# Patient Record
Sex: FEMALE | Race: BLACK OR AFRICAN AMERICAN | NOT HISPANIC OR LATINO | Employment: UNEMPLOYED | URBAN - METROPOLITAN AREA
[De-identification: names, ages, dates, MRNs, and addresses within clinical notes are randomized per-mention and may not be internally consistent; named-entity substitution may affect disease eponyms.]

---

## 2017-12-18 ENCOUNTER — OFFICE VISIT (OUTPATIENT)
Dept: GASTROENTEROLOGY | Facility: CLINIC | Age: 47
End: 2017-12-18
Payer: MEDICAID

## 2017-12-18 VITALS
DIASTOLIC BLOOD PRESSURE: 84 MMHG | HEIGHT: 61 IN | SYSTOLIC BLOOD PRESSURE: 124 MMHG | WEIGHT: 143 LBS | BODY MASS INDEX: 27 KG/M2

## 2017-12-18 DIAGNOSIS — E01.0 THYROMEGALY: ICD-10-CM

## 2017-12-18 DIAGNOSIS — K62.5 RECTAL BLEEDING: Primary | ICD-10-CM

## 2017-12-18 PROCEDURE — 99204 OFFICE O/P NEW MOD 45 MIN: CPT | Mod: S$PBB,,, | Performed by: INTERNAL MEDICINE

## 2017-12-18 PROCEDURE — 99213 OFFICE O/P EST LOW 20 MIN: CPT | Mod: PBBFAC,PO | Performed by: INTERNAL MEDICINE

## 2017-12-18 PROCEDURE — 99999 PR PBB SHADOW E&M-EST. PATIENT-LVL III: CPT | Mod: PBBFAC,,, | Performed by: INTERNAL MEDICINE

## 2017-12-18 RX ORDER — DICYCLOMINE HYDROCHLORIDE 10 MG/1
10 CAPSULE ORAL 4 TIMES DAILY PRN
Qty: 120 CAPSULE | Refills: 3 | Status: SHIPPED | OUTPATIENT
Start: 2017-12-18 | End: 2018-01-17

## 2017-12-18 NOTE — PROGRESS NOTES
Subjective:       Patient ID: Sonia Hicks is a 47 y.o. female.    Chief Complaint: Rectal Bleeding    Rectal Bleeding   This is a chronic problem. Episode onset: 4 years. The problem occurs intermittently (happens with qo BM). The problem has been unchanged. Associated symptoms include abdominal pain (lower abdomen) and headaches. Pertinent negatives include no arthralgias, chest pain, fever, joint swelling, nausea, neck pain, rash, sore throat, vomiting or weakness. Associated symptoms comments: There is no constipation or diarrhea  . Nothing aggravates the symptoms. She has tried nothing for the symptoms.   Patient had colonoscopy done a year ago. She does not remember what was found or the name of the doctor.  She is currently being treated for H. pylori      Review of Systems   Constitutional: Negative for appetite change and fever.   HENT: Negative for sore throat and trouble swallowing.    Eyes: Negative for photophobia and visual disturbance.   Respiratory: Negative for wheezing.    Cardiovascular: Negative for chest pain and palpitations.   Gastrointestinal: Positive for abdominal pain (lower abdomen) and hematochezia. Negative for diarrhea, nausea and vomiting.        See HPI for details   Musculoskeletal: Negative for arthralgias, joint swelling and neck pain.   Skin: Negative for rash and wound.   Neurological: Positive for headaches. Negative for dizziness, tremors and weakness.   Psychiatric/Behavioral: Negative for behavioral problems and suicidal ideas.       Objective:       Vitals:    12/18/17 1322   BP: 124/84       Physical Exam   Constitutional: She is oriented to person, place, and time. She appears well-nourished.   HENT:   Mouth/Throat: Oropharynx is clear and moist.   Eyes: Pupils are equal, round, and reactive to light.   Neck: Neck supple.   Cardiovascular: Normal heart sounds.    Pulmonary/Chest: Effort normal and breath sounds normal.   Abdominal: Soft. She exhibits no mass. There is no  tenderness. There is no guarding.   Musculoskeletal: Normal range of motion.   Lymphadenopathy:     She has no cervical adenopathy.   Neurological: She is alert and oriented to person, place, and time.   Skin: Skin is warm. No rash noted.   Psychiatric: She has a normal mood and affect.       Past Medical History:   Diagnosis Date    High cholesterol     Hypertension     Ovarian cyst        Past Surgical History:   Procedure Laterality Date    ABDOMINAL SURGERY      oophorectomy         Family History   Problem Relation Age of Onset    Hypertension Mother     Diabetes Mother     Heart disease Mother       of MI    Liver disease Mother      failure    Heart disease Sister     Hypertension Brother     Diabetes Brother     Heart disease Sister        Social History     Social History    Marital status: Single     Spouse name: N/A    Number of children: N/A    Years of education: N/A     Occupational History     [employer]      Social History Main Topics    Smoking status: Current Every Day Smoker     Packs/day: 0.50     Years: 30.00     Types: Cigarettes    Smokeless tobacco: Never Used    Alcohol use Yes      Comment: social ~1/week no more than 4-5 drinks/occasion    Drug use: Yes     Types: Marijuana      Comment: former THC    Sexual activity: Not Asked     Other Topics Concern    None     Social History Narrative    None           Review of patient's allergies indicates:  No Known Allergies    Assessment:       1. Rectal bleeding    2. Thyromegaly        Plan:       Rectal bleeding  -     CBC auto differential; Future; Expected date: 2017    Thyromegaly  -     TSH; Future; Expected date: 2017  -     US Soft Tissue Head Neck Thyroid; Future; Expected date: 2017    Obtain medical record from Dr. Fierro     Further recommendation to follow

## 2018-01-23 ENCOUNTER — TELEPHONE (OUTPATIENT)
Dept: GASTROENTEROLOGY | Facility: CLINIC | Age: 48
End: 2018-01-23

## 2018-01-23 NOTE — TELEPHONE ENCOUNTER
Spoke with pt and she would like to get scheduled for a f/u appt with Dr. Radford. Informed pt that the soonest that Dr. Radford has is 3/12/18. Pt has been offered a sooner appt with Magali on 3/9/18 at 7:40am. Verbal Understanding.

## 2018-03-09 ENCOUNTER — HOSPITAL ENCOUNTER (EMERGENCY)
Facility: HOSPITAL | Age: 48
Discharge: HOME | End: 2018-03-09
Payer: MEDICAID

## 2018-03-09 VITALS
WEIGHT: 138 LBS | BODY MASS INDEX: 26.07 KG/M2 | OXYGEN SATURATION: 97 % | TEMPERATURE: 98 F | SYSTOLIC BLOOD PRESSURE: 134 MMHG | RESPIRATION RATE: 18 BRPM | DIASTOLIC BLOOD PRESSURE: 85 MMHG | HEART RATE: 80 BPM

## 2018-03-09 DIAGNOSIS — S13.4XXA WHIPLASH INJURY TO NECK, INITIAL ENCOUNTER: ICD-10-CM
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
## 2018-03-09 DIAGNOSIS — S39.012A BACK STRAIN, INITIAL ENCOUNTER: ICD-10-CM

## 2018-03-09 PROCEDURE — 25000003 PHARM REV CODE 250: Performed by: NURSE PRACTITIONER

## 2018-03-09 PROCEDURE — 99283 EMERGENCY DEPT VISIT LOW MDM: CPT

## 2018-03-09 RX ORDER — KETOROLAC TROMETHAMINE 10 MG/1
10 TABLET, FILM COATED ORAL
Status: COMPLETED | OUTPATIENT
Start: 2018-03-09 | End: 2018-03-09

## 2018-03-09 RX ORDER — METHOCARBAMOL 500 MG/1
1000 TABLET, FILM COATED ORAL 3 TIMES DAILY PRN
Qty: 15 TABLET | Refills: 0 | Status: SHIPPED | OUTPATIENT
Start: 2018-03-09 | End: 2018-03-14

## 2018-03-09 RX ORDER — METHOCARBAMOL 500 MG/1
500 TABLET, FILM COATED ORAL
Status: COMPLETED | OUTPATIENT
Start: 2018-03-09 | End: 2018-03-09

## 2018-03-09 RX ORDER — NAPROXEN SODIUM 550 MG/1
550 TABLET ORAL 2 TIMES DAILY WITH MEALS
Qty: 20 TABLET | Refills: 0 | Status: ON HOLD | OUTPATIENT
Start: 2018-03-09 | End: 2018-04-19 | Stop reason: CLARIF

## 2018-03-09 RX ORDER — KETOROLAC TROMETHAMINE 10 MG/1
TABLET, FILM COATED ORAL
Status: DISPENSED
Start: 2018-03-09 | End: 2018-03-10

## 2018-03-09 RX ORDER — METHOCARBAMOL 500 MG/1
TABLET, FILM COATED ORAL
Status: DISPENSED
Start: 2018-03-09 | End: 2018-03-10

## 2018-03-09 RX ADMIN — KETOROLAC TROMETHAMINE 10 MG: 10 TABLET, FILM COATED ORAL at 05:03

## 2018-03-09 RX ADMIN — METHOCARBAMOL 500 MG: 500 TABLET ORAL at 05:03
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
## 2018-03-23 ENCOUNTER — OFFICE VISIT (OUTPATIENT)
Dept: GASTROENTEROLOGY | Facility: CLINIC | Age: 48
End: 2018-03-23
Payer: MEDICAID

## 2018-03-23 VITALS
BODY MASS INDEX: 26.06 KG/M2 | WEIGHT: 138 LBS | HEIGHT: 61 IN | DIASTOLIC BLOOD PRESSURE: 94 MMHG | SYSTOLIC BLOOD PRESSURE: 131 MMHG

## 2018-03-23 DIAGNOSIS — R12 HEARTBURN: ICD-10-CM

## 2018-03-23 DIAGNOSIS — Z86.19 HISTORY OF HELICOBACTER PYLORI INFECTION: Primary | ICD-10-CM

## 2018-03-23 DIAGNOSIS — K62.5 ANAL BLEEDING: ICD-10-CM

## 2018-03-23 DIAGNOSIS — K59.00 CONSTIPATION, UNSPECIFIED CONSTIPATION TYPE: ICD-10-CM

## 2018-03-23 DIAGNOSIS — R10.13 ABDOMINAL PAIN, EPIGASTRIC: ICD-10-CM

## 2018-03-23 PROCEDURE — 99212 OFFICE O/P EST SF 10 MIN: CPT | Mod: PBBFAC,PO | Performed by: NURSE PRACTITIONER

## 2018-03-23 PROCEDURE — 99999 PR PBB SHADOW E&M-EST. PATIENT-LVL II: CPT | Mod: PBBFAC,,, | Performed by: NURSE PRACTITIONER

## 2018-03-23 PROCEDURE — 99213 OFFICE O/P EST LOW 20 MIN: CPT | Mod: S$PBB,,, | Performed by: NURSE PRACTITIONER

## 2018-03-23 RX ORDER — HYDROCODONE BITARTRATE AND ACETAMINOPHEN 7.5; 325 MG/1; MG/1
1 TABLET ORAL EVERY 6 HOURS PRN
Status: ON HOLD | COMMUNITY
End: 2018-04-19 | Stop reason: CLARIF

## 2018-03-23 NOTE — TELEPHONE ENCOUNTER
Spoke with pt and was able to get the name of her PCP Dr. Antonietta Bonilla at [facility]. Tried Contacting clinic NO ANSWER. Will continue trying number.

## 2018-03-23 NOTE — TELEPHONE ENCOUNTER
At one point she reports she had a colonoscopy and another time is unsure.   She is unsure if she has had EGD.    Can we please check with her PCP/referring provider and try to get some clarity on whether or not she has had EGD or colonoscopy?

## 2018-03-23 NOTE — PROGRESS NOTES
Subjective:       Patient ID: Sonia Hicks is a 47 y.o. female.    Chief Complaint: Follow-up    HPI  Presents reporting that she is following up.  She was seen by Dr. Radford last year with complaints of rectal bleeding.    Labs and thyroid US ordered.  She reports she had these done in [place] and no findings.  She reports that she continues to have episodic rectal bleeding.  Reports this may occur for several days together one time per month.  She does report constipation that is improved with drinking fruit juice.  She tends to have small volume hard stools.  Denies anal rectal pain.    She reports intermittent epigastric burning, heartburn and nausea.    Dr. Radford mentioned that she'd had colonoscopy last year.  The patient does not recall.  Also mentioned that she was being treated for h pylori and the patient does not know if she has had EGD.  She does not use NSAIDs.    Review of Systems   Constitutional: Negative for activity change, appetite change, fatigue, fever and unexpected weight change.   Respiratory: Negative for shortness of breath.    Cardiovascular: Negative for chest pain.   Gastrointestinal: Positive for abdominal pain, anal bleeding, constipation and nausea. Negative for rectal pain.   Genitourinary: Negative.    Skin: Negative.    Neurological: Negative.    Psychiatric/Behavioral: Negative.        Objective:      Physical Exam   Constitutional: She is oriented to person, place, and time. She appears well-developed and well-nourished. No distress.   HENT:   Head: Normocephalic.   Cardiovascular: Normal rate.    Pulmonary/Chest: Effort normal. No respiratory distress.   Abdominal: Soft. Bowel sounds are normal. She exhibits no distension. There is tenderness (mild discomfort in the epigastrum). There is no guarding.   Neurological: She is alert and oriented to person, place, and time.   Skin: Skin is warm and dry. She is not diaphoretic.   Psychiatric: She has a normal mood and affect. Her  behavior is normal. Judgment and thought content normal.   Vitals reviewed.      Assessment:       1. History of Helicobacter pylori infection    2. Constipation, unspecified constipation type    3. Anal bleeding    4. Heartburn        Plan:     Sonia was seen today for follow-up.    Diagnoses and all orders for this visit:    History of Helicobacter pylori infection  -     H. pylori antigen, stool; Future    Constipation, unspecified constipation type    Anal bleeding    Heartburn    Other orders  -     ranitidine (ZANTAC) 150 MG tablet; Take 1 tablet (150 mg total) by mouth 2 (two) times daily.        -   High fiber diet  -   Fiber supplement  -   [place] fluid intake   -   Exercice  -   Sitz      Stool for h pylori.  Will attempt to request further records as she is unsure if she has had EGD or colonoscopy.    If these have not been completed, will need to schedule.

## 2018-04-03 ENCOUNTER — TELEPHONE (OUTPATIENT)
Dept: GASTROENTEROLOGY | Facility: CLINIC | Age: 48
End: 2018-04-03

## 2018-04-03 NOTE — TELEPHONE ENCOUNTER
Contacted [facility] Medical Records department again. Pt does not have any records for an EGD and Colonoscopy. Pt will need to be scheduled for these procedures.

## 2018-04-03 NOTE — TELEPHONE ENCOUNTER
Spoke with pt and she has agreed to schedule an EGD and Colonoscopy on 4/19/18 with Dr. Padilla. [facility] Prep and information has been explained to patient, and mailed out to home address. Verbal Understanding.

## 2018-04-10 DIAGNOSIS — K62.5 ANAL BLEEDING: ICD-10-CM

## 2018-04-10 DIAGNOSIS — K59.00 CONSTIPATION, UNSPECIFIED CONSTIPATION TYPE: ICD-10-CM

## 2018-04-10 DIAGNOSIS — Z86.19 HISTORY OF HELICOBACTER PYLORI INFECTION: ICD-10-CM

## 2018-04-10 DIAGNOSIS — R12 HEARTBURN: ICD-10-CM

## 2018-04-10 RX ORDER — POLYETHYLENE GLYCOL 3350, SODIUM SULFATE ANHYDROUS, SODIUM BICARBONATE, SODIUM CHLORIDE, POTASSIUM CHLORIDE 236; 22.74; 6.74; 5.86; 2.97 G/4L; G/4L; G/4L; G/4L; G/4L
4 POWDER, FOR SOLUTION ORAL SEE ADMIN INSTRUCTIONS
Qty: 4000 ML | Refills: 0 | Status: SHIPPED | OUTPATIENT
Start: 2018-04-10 | End: 2018-04-10 | Stop reason: SDUPTHER

## 2018-04-10 RX ORDER — POLYETHYLENE GLYCOL 3350, SODIUM SULFATE ANHYDROUS, SODIUM BICARBONATE, SODIUM CHLORIDE, POTASSIUM CHLORIDE 236; 22.74; 6.74; 5.86; 2.97 G/4L; G/4L; G/4L; G/4L; G/4L
4 POWDER, FOR SOLUTION ORAL SEE ADMIN INSTRUCTIONS
Qty: 4000 ML | Refills: 0 | Status: SHIPPED | OUTPATIENT
Start: 2018-04-10 | End: 2018-06-20

## 2018-04-18 ENCOUNTER — LAB VISIT (OUTPATIENT)
Dept: LAB | Facility: HOSPITAL | Age: 48
End: 2018-04-18
Attending: NURSE PRACTITIONER
Payer: MEDICAID

## 2018-04-18 DIAGNOSIS — Z86.19 HISTORY OF HELICOBACTER PYLORI INFECTION: ICD-10-CM

## 2018-04-18 PROCEDURE — 87338 HPYLORI STOOL AG IA: CPT | Mod: PO

## 2018-04-19 ENCOUNTER — HOSPITAL ENCOUNTER (OUTPATIENT)
Facility: HOSPITAL | Age: 48
Discharge: HOME | End: 2018-04-19
Attending: INTERNAL MEDICINE | Admitting: INTERNAL MEDICINE
Payer: MEDICAID

## 2018-04-19 ENCOUNTER — SURGERY (OUTPATIENT)
Age: 48
End: 2018-04-19

## 2018-04-19 ENCOUNTER — ANESTHESIA (OUTPATIENT)
Dept: ENDOSCOPY | Facility: HOSPITAL | Age: 48
End: 2018-04-19
Payer: MEDICAID

## 2018-04-19 ENCOUNTER — ANESTHESIA EVENT (OUTPATIENT)
Dept: ENDOSCOPY | Facility: HOSPITAL | Age: 48
End: 2018-04-19
Payer: MEDICAID

## 2018-04-19 DIAGNOSIS — K92.1 HEMATOCHEZIA: Primary | ICD-10-CM

## 2018-04-19 DIAGNOSIS — Z12.11 SCREENING FOR MALIGNANT NEOPLASM OF COLON: ICD-10-CM

## 2018-04-19 PROCEDURE — 63600175 PHARM REV CODE 636 W HCPCS: Performed by: NURSE ANESTHETIST, CERTIFIED REGISTERED

## 2018-04-19 PROCEDURE — 27201012 HC FORCEPS, HOT/COLD, DISP: Performed by: INTERNAL MEDICINE

## 2018-04-19 PROCEDURE — 25000003 PHARM REV CODE 250: Performed by: INTERNAL MEDICINE

## 2018-04-19 PROCEDURE — 00813 ANES UPR LWR GI NDSC PX: CPT | Performed by: INTERNAL MEDICINE

## 2018-04-19 PROCEDURE — 45380 COLONOSCOPY AND BIOPSY: CPT | Mod: 59 | Performed by: INTERNAL MEDICINE

## 2018-04-19 PROCEDURE — 88305 TISSUE EXAM BY PATHOLOGIST: CPT | Performed by: PATHOLOGY

## 2018-04-19 PROCEDURE — 45385 COLONOSCOPY W/LESION REMOVAL: CPT

## 2018-04-19 PROCEDURE — 37000009 HC ANESTHESIA EA ADD 15 MINS: Performed by: INTERNAL MEDICINE

## 2018-04-19 PROCEDURE — 88305 TISSUE EXAM BY PATHOLOGIST: CPT | Mod: 26,,, | Performed by: PATHOLOGY

## 2018-04-19 PROCEDURE — 45380 COLONOSCOPY AND BIOPSY: CPT | Mod: 59,,, | Performed by: INTERNAL MEDICINE

## 2018-04-19 PROCEDURE — 45385 COLONOSCOPY W/LESION REMOVAL: CPT | Mod: ,,, | Performed by: INTERNAL MEDICINE

## 2018-04-19 PROCEDURE — 37000008 HC ANESTHESIA 1ST 15 MINUTES: Performed by: INTERNAL MEDICINE

## 2018-04-19 PROCEDURE — 43239 EGD BIOPSY SINGLE/MULTIPLE: CPT | Performed by: INTERNAL MEDICINE

## 2018-04-19 PROCEDURE — 43239 EGD BIOPSY SINGLE/MULTIPLE: CPT | Mod: 51,,, | Performed by: INTERNAL MEDICINE

## 2018-04-19 RX ORDER — LIDOCAINE HCL/PF 100 MG/5ML
SYRINGE (ML) INTRAVENOUS
Status: DISCONTINUED | OUTPATIENT
Start: 2018-04-19 | End: 2018-04-19

## 2018-04-19 RX ORDER — PROPOFOL 10 MG/ML
VIAL (ML) INTRAVENOUS CONTINUOUS PRN
Status: DISCONTINUED | OUTPATIENT
Start: 2018-04-19 | End: 2018-04-19

## 2018-04-19 RX ORDER — PROPOFOL 10 MG/ML
VIAL (ML) INTRAVENOUS
Status: DISCONTINUED | OUTPATIENT
Start: 2018-04-19 | End: 2018-04-19

## 2018-04-19 RX ORDER — SODIUM CHLORIDE 9 MG/ML
INJECTION, SOLUTION INTRAVENOUS CONTINUOUS
Status: DISCONTINUED | OUTPATIENT
Start: 2018-04-19 | End: 2018-04-19 | Stop reason: HOSPADM

## 2018-04-19 RX ADMIN — PROPOFOL 50 MG: 10 INJECTION, EMULSION INTRAVENOUS at 10:04

## 2018-04-19 RX ADMIN — PROPOFOL 150 MCG/KG/MIN: 10 INJECTION, EMULSION INTRAVENOUS at 10:04

## 2018-04-19 RX ADMIN — LIDOCAINE HYDROCHLORIDE 80 MG: 20 INJECTION, SOLUTION INTRAVENOUS at 10:04

## 2018-04-19 RX ADMIN — SODIUM CHLORIDE: 0.9 INJECTION, SOLUTION INTRAVENOUS at 09:04

## 2018-04-19 NOTE — PROVATION PATIENT INSTRUCTIONS
Discharge Summary/Instructions after an Endoscopic Procedure  Patient Name: Sonia Hicks  Patient MRN: [id number]  Patient YOB: 1970 Thursday, April 19, 2018  Nina Padilla MD  RESTRICTIONS:  During your procedure today, you received medications for sedation.  These   medications may affect your judgment, balance and coordination.  Therefore,   for 24 hours, you have the following restrictions:   - DO NOT drive a car, operate machinery, make legal/financial decisions,   sign important papers or drink alcohol.    ACTIVITY:  The following day: return to full activity including work, except no heavy   lifting, straining or running for 3 days if polyps were removed.  DIET:  Eat and drink normally unless instructed otherwise.     TREATMENT FOR COMMON SIDE EFFECTS:  - Mild abdominal pain, nausea, belching, bloating or excessive gas:  rest,   eat lightly and use a heating pad.  - Sore Throat: treat with throat lozenges and/or gargle with warm salt   water.  - Because air was used during the procedure, expelling large amounts of air   from your rectum or belching is normal.  - If a bowel prep was taken, you may not have a bowel movement for 1-3 days.    This is normal.  SYMPTOMS TO WATCH FOR AND REPORT TO YOUR PHYSICIAN:  1. Abdominal pain or bloating, other than gas cramps.  2. Chest pain.  3. Back pain.  4. Signs of infection such as: chills or fever occurring within 24 hours   after the procedure.  5. Rectal bleeding, which would show as bright red, maroon, or black stools.   (A tablespoon of blood from the rectum is not serious, especially if   hemorrhoids are present.)  6. Vomiting.  7. Weakness or dizziness.  GO DIRECTLY TO THE NEAREST EMERGENCY ROOM IF YOU HAVE ANY OF THE FOLLOWING:      Difficulty breathing              Chills and/or fever over 101 F   Persistent vomiting and/or vomiting blood   Severe abdominal pain   Severe chest pain   Black, tarry stools   Bleeding- more than one tablespoon   Any  other symptom or condition that you feel may need urgent attention  Your doctor recommends these additional instructions:  If any biopsies were taken, your doctors clinic will contact you in 1 to 2   weeks with any results.  - Discharge patient to home (via wheelchair).   - Patient has a contact number available for emergencies.  The signs and   symptoms of potential delayed complications were discussed with the   patient.  Return to normal activities tomorrow.  Written discharge   instructions were provided to the patient.   - Resume previous diet.   - Continue present medications.   - Await pathology results.   - Repeat colonoscopy date to be determined after pending pathology results   are reviewed for surveillance.  For questions, problems or results please call your physician - Nina Padilla MD at Work:  [phone].  EMERGENCY PHONE NUMBER: [phone],  LAB RESULTS: [phone]  IF A COMPLICATION OR EMERGENCY SITUATION ARISES AND YOU ARE UNABLE TO REACH   YOUR PHYSICIAN - GO DIRECTLY TO THE EMERGENCY ROOM.  Nina Padilla MD  4/19/2018 10:38:47 AM  This report has been verified and signed electronically.

## 2018-04-19 NOTE — H&P (VIEW-ONLY)
Subjective:       Patient ID: Sonia Hicks is a 47 y.o. female.    Chief Complaint: Follow-up    HPI  Presents reporting that she is following up.  She was seen by Dr. Radford last year with complaints of rectal bleeding.    Labs and thyroid US ordered.  She reports she had these done in [place] and no findings.  She reports that she continues to have episodic rectal bleeding.  Reports this may occur for several days together one time per month.  She does report constipation that is improved with drinking fruit juice.  She tends to have small volume hard stools.  Denies anal rectal pain.    She reports intermittent epigastric burning, heartburn and nausea.    Dr. Radford mentioned that she'd had colonoscopy last year.  The patient does not recall.  Also mentioned that she was being treated for h pylori and the patient does not know if she has had EGD.  She does not use NSAIDs.    Review of Systems   Constitutional: Negative for activity change, appetite change, fatigue, fever and unexpected weight change.   Respiratory: Negative for shortness of breath.    Cardiovascular: Negative for chest pain.   Gastrointestinal: Positive for abdominal pain, anal bleeding, constipation and nausea. Negative for rectal pain.   Genitourinary: Negative.    Skin: Negative.    Neurological: Negative.    Psychiatric/Behavioral: Negative.        Objective:      Physical Exam   Constitutional: She is oriented to person, place, and time. She appears well-developed and well-nourished. No distress.   HENT:   Head: Normocephalic.   Cardiovascular: Normal rate.    Pulmonary/Chest: Effort normal. No respiratory distress.   Abdominal: Soft. Bowel sounds are normal. She exhibits no distension. There is tenderness (mild discomfort in the epigastrum). There is no guarding.   Neurological: She is alert and oriented to person, place, and time.   Skin: Skin is warm and dry. She is not diaphoretic.   Psychiatric: She has a normal mood and affect. Her  behavior is normal. Judgment and thought content normal.   Vitals reviewed.      Assessment:       1. History of Helicobacter pylori infection    2. Constipation, unspecified constipation type    3. Anal bleeding    4. Heartburn        Plan:     Sonia was seen today for follow-up.    Diagnoses and all orders for this visit:    History of Helicobacter pylori infection  -     H. pylori antigen, stool; Future    Constipation, unspecified constipation type    Anal bleeding    Heartburn    Other orders  -     ranitidine (ZANTAC) 150 MG tablet; Take 1 tablet (150 mg total) by mouth 2 (two) times daily.        -   High fiber diet  -   Fiber supplement  -   [place] fluid intake   -   Exercice  -   Sitz      Stool for h pylori.  Will attempt to request further records as she is unsure if she has had EGD or colonoscopy.    If these have not been completed, will need to schedule.

## 2018-04-19 NOTE — PROVATION PATIENT INSTRUCTIONS
Discharge Summary/Instructions after an Endoscopic Procedure  Patient Name: Sonia Hicks  Patient MRN: [id number]  Patient YOB: 1970 Thursday, April 19, 2018  Nina Padilla MD  RESTRICTIONS:  During your procedure today, you received medications for sedation.  These   medications may affect your judgment, balance and coordination.  Therefore,   for 24 hours, you have the following restrictions:   - DO NOT drive a car, operate machinery, make legal/financial decisions,   sign important papers or drink alcohol.    ACTIVITY:  The following day: return to full activity including work, except no heavy   lifting, straining or running for 3 days if polyps were removed.  DIET:  Eat and drink normally unless instructed otherwise.     TREATMENT FOR COMMON SIDE EFFECTS:  - Mild abdominal pain, nausea, belching, bloating or excessive gas:  rest,   eat lightly and use a heating pad.  - Sore Throat: treat with throat lozenges and/or gargle with warm salt   water.  - Because air was used during the procedure, expelling large amounts of air   from your rectum or belching is normal.  - If a bowel prep was taken, you may not have a bowel movement for 1-3 days.    This is normal.  SYMPTOMS TO WATCH FOR AND REPORT TO YOUR PHYSICIAN:  1. Abdominal pain or bloating, other than gas cramps.  2. Chest pain.  3. Back pain.  4. Signs of infection such as: chills or fever occurring within 24 hours   after the procedure.  5. Rectal bleeding, which would show as bright red, maroon, or black stools.   (A tablespoon of blood from the rectum is not serious, especially if   hemorrhoids are present.)  6. Vomiting.  7. Weakness or dizziness.  GO DIRECTLY TO THE NEAREST EMERGENCY ROOM IF YOU HAVE ANY OF THE FOLLOWING:      Difficulty breathing              Chills and/or fever over 101 F   Persistent vomiting and/or vomiting blood   Severe abdominal pain   Severe chest pain   Black, tarry stools   Bleeding- more than one tablespoon   Any  other symptom or condition that you feel may need urgent attention  Your doctor recommends these additional instructions:  If any biopsies were taken, your doctors clinic will contact you in 1 to 2   weeks with any results.  - Discharge patient to home (via wheelchair).   - Patient has a contact number available for emergencies.  The signs and   symptoms of potential delayed complications were discussed with the   patient.  Return to normal activities tomorrow.  Written discharge   instructions were provided to the patient.   - Resume previous diet.   - Continue present medications.   - Await pathology results.  For questions, problems or results please call your physician - Nina Padilla MD at Work:  [phone].  EMERGENCY PHONE NUMBER: [phone],  LAB RESULTS: [phone]  IF A COMPLICATION OR EMERGENCY SITUATION ARISES AND YOU ARE UNABLE TO REACH   YOUR PHYSICIAN - GO DIRECTLY TO THE EMERGENCY ROOM.  Nina Padilla MD  4/19/2018 10:11:23 AM  This report has been verified and signed electronically.

## 2018-04-19 NOTE — ANESTHESIA POSTPROCEDURE EVALUATION
"Anesthesia Post Evaluation    Patient: Sonia Hicks    Procedure(s) Performed: Procedure(s) (LRB):  ESOPHAGOGASTRODUODENOSCOPY (EGD) (N/A)  COLONOSCOPY Golytely (N/A)    Final Anesthesia Type: MAC  Patient location during evaluation: GI PACU  Patient participation: Yes- Able to Participate  Level of consciousness: awake and alert and oriented  Post-procedure vital signs: reviewed and stable  Pain management: adequate  Airway patency: patent  PONV status at discharge: No PONV  Anesthetic complications: no      Cardiovascular status: blood pressure returned to baseline, hemodynamically stable and stable  Respiratory status: room air, unassisted and spontaneous ventilation  Hydration status: euvolemic  Follow-up not needed.        Visit Vitals  /75   Pulse 79   Temp 37.1 °C (98.7 °F) (Oral)   Resp 18   Ht 5' 1" (1.549 m)   Wt 60.8 kg (134 lb)   SpO2 98%   Breastfeeding? No   BMI 25.32 kg/m²       Pain/Hailey Score: Presence of Pain: denies (4/19/2018  9:25 AM)      "

## 2018-04-19 NOTE — TRANSFER OF CARE
"Anesthesia Transfer of Care Note    Patient: Sonia Hicks    Procedure(s) Performed: Procedure(s) (LRB):  ESOPHAGOGASTRODUODENOSCOPY (EGD) (N/A)  COLONOSCOPY Golytely (N/A)    Patient location: GI    Anesthesia Type: MAC    Transport from OR: Transported from OR on room air with adequate spontaneous ventilation    Post pain: adequate analgesia    Post assessment: no apparent anesthetic complications    Post vital signs: stable    Level of consciousness: awake, alert and oriented    Nausea/Vomiting: no nausea/vomiting    Complications: none    Transfer of care protocol was followed      Last vitals:   Visit Vitals  /75   Pulse 79   Temp 37.1 °C (98.7 °F) (Oral)   Resp 18   Ht 5' 1" (1.549 m)   Wt 60.8 kg (134 lb)   SpO2 98%   Breastfeeding? No   BMI 25.32 kg/m²     "

## 2018-04-19 NOTE — ANESTHESIA PREPROCEDURE EVALUATION
04/19/2018  Sonia Hicks is a 47 y.o., female having an upper/lower endo.   Hx HTN, ?dysphagia, rectal; bleeding.       Anesthesia Evaluation    I have reviewed the Patient Summary Reports.    I have reviewed the Nursing Notes.   I have reviewed the Medications.     Review of Systems  Anesthesia Hx:  No problems with previous Anesthesia   Denies Personal Hx of Anesthesia complications.   Social:  Smoker    Cardiovascular:   Hypertension        Physical Exam  General:  Well nourished    Airway/Jaw/Neck:  Airway Findings: Mouth Opening: Normal Tongue: Normal  General Airway Assessment: Adult  Mallampati: III  Improves to II with phonation.  TM Distance: Normal, at least 6 cm  Jaw/Neck Findings:  Neck ROM: Normal ROM       Chest/Lungs:  Chest/Lungs Findings: Clear to auscultation, Normal Respiratory Rate     Heart/Vascular:  Heart Findings: Rate: Normal  Rhythm: Regular Rhythm  Sounds: Normal        Mental Status:  Mental Status Findings:  Alert and Oriented         Anesthesia Plan  Type of Anesthesia, risks & benefits discussed:  Anesthesia Type:  MAC  Patient's Preference:   Intra-op Monitoring Plan:   Intra-op Monitoring Plan Comments:   Post Op Pain Control Plan:   Post Op Pain Control Plan Comments:   Induction:   IV  Beta Blocker:  Patient is not currently on a Beta-Blocker (No further documentation required).       Informed Consent: Patient understands risks and agrees with Anesthesia plan.  Questions answered. Anesthesia consent signed with patient.  ASA Score: 2     Day of Surgery Review of History & Physical: I have interviewed and examined the patient. I have reviewed the patient's H&P dated:            Ready For Surgery From Anesthesia Perspective.

## 2018-04-20 VITALS
HEART RATE: 90 BPM | HEIGHT: 61 IN | SYSTOLIC BLOOD PRESSURE: 126 MMHG | BODY MASS INDEX: 25.3 KG/M2 | TEMPERATURE: 98 F | WEIGHT: 134 LBS | RESPIRATION RATE: 17 BRPM | DIASTOLIC BLOOD PRESSURE: 93 MMHG | OXYGEN SATURATION: 100 %

## 2018-04-22 LAB — H PYLORI AG STL QL: NOT DETECTED

## 2018-04-26 ENCOUNTER — TELEPHONE (OUTPATIENT)
Dept: GASTROENTEROLOGY | Facility: CLINIC | Age: 48
End: 2018-04-26

## 2018-04-26 NOTE — TELEPHONE ENCOUNTER
Results given to patient. Patient verbalized understanding. Symptoms persists.  Post procedure appt scheduled.

## 2018-04-26 NOTE — TELEPHONE ENCOUNTER
----- Message from Nina Padilla MD sent at 4/25/2018  7:54 AM CDT -----  Moderate stomach inflammation, hpylori negative. Colon polyps benign, 10 year recall colonoscopy; can f/u in clinic if persistent symptoms

## 2018-04-26 NOTE — TELEPHONE ENCOUNTER
----- Message from ABE Mirza sent at 4/24/2018 11:19 AM CDT -----  Let her know that stool specimen is negative for h pylori

## 2018-05-01 ENCOUNTER — TELEPHONE (OUTPATIENT)
Dept: GASTROENTEROLOGY | Facility: CLINIC | Age: 48
End: 2018-05-01

## 2018-05-01 NOTE — TELEPHONE ENCOUNTER
Left a message for patient to call the office back in regards to appointment that is scheduled for tomorrow.

## 2018-05-01 NOTE — TELEPHONE ENCOUNTER
----- Message from Trang Muhammad sent at 5/1/2018 12:36 PM CDT -----  Contact: Self/ [phone]  Patient would like to reschedule her appointment for next week. She has Medicaid insurance.    Please call and advise.

## 2018-06-20 ENCOUNTER — HOSPITAL ENCOUNTER (EMERGENCY)
Facility: HOSPITAL | Age: 48
Discharge: HOME | End: 2018-06-20
Attending: EMERGENCY MEDICINE
Payer: MEDICAID

## 2018-06-20 VITALS
DIASTOLIC BLOOD PRESSURE: 95 MMHG | HEIGHT: 61 IN | HEART RATE: 70 BPM | SYSTOLIC BLOOD PRESSURE: 134 MMHG | RESPIRATION RATE: 20 BRPM | WEIGHT: 130 LBS | TEMPERATURE: 98 F | BODY MASS INDEX: 24.55 KG/M2 | OXYGEN SATURATION: 96 %

## 2018-06-20 DIAGNOSIS — S29.012A STRAIN OF THORACIC PARASPINAL MUSCLES EXCLUDING T1 AND T2 LEVELS, INITIAL ENCOUNTER: Primary | ICD-10-CM

## 2018-06-20 DIAGNOSIS — S39.012A STRAIN OF LUMBAR PARASPINAL MUSCLE, INITIAL ENCOUNTER: ICD-10-CM
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
## 2018-06-20 LAB — B-HCG UR QL: NEGATIVE

## 2018-06-20 PROCEDURE — 99284 EMERGENCY DEPT VISIT MOD MDM: CPT | Mod: 25

## 2018-06-20 PROCEDURE — 81025 URINE PREGNANCY TEST: CPT

## 2018-06-20 RX ORDER — CYCLOBENZAPRINE HCL 5 MG
5 TABLET ORAL 3 TIMES DAILY PRN
Qty: 15 TABLET | Refills: 0 | Status: SHIPPED | OUTPATIENT
Start: 2018-06-20 | End: 2018-06-25

## 2018-06-20 NOTE — ED NOTES
"Pt called to triage, adult female in waiting area reports "she will be there in a moment, she let someone else go ahead of her that had an emergency." Reports pt has to check in other family members.   "
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
## 2018-10-08 ENCOUNTER — LAB VISIT (OUTPATIENT)
Dept: LAB | Facility: HOSPITAL | Age: 48
End: 2018-10-08
Attending: NURSE PRACTITIONER
Payer: MEDICAID

## 2018-10-08 DIAGNOSIS — Z79.899 OTHER LONG TERM (CURRENT) DRUG THERAPY: Primary | ICD-10-CM

## 2018-10-08 LAB
BASOPHILS # BLD AUTO: 0.01 K/UL
BASOPHILS NFR BLD: 0.1 %
CHOLEST SERPL-MCNC: 272 MG/DL
CHOLEST/HDLC SERPL: 5 {RATIO}
DIFFERENTIAL METHOD: ABNORMAL
EOSINOPHIL # BLD AUTO: 0 K/UL
EOSINOPHIL NFR BLD: 0.6 %
ERYTHROCYTE [DISTWIDTH] IN BLOOD BY AUTOMATED COUNT: 14.7 %
HCT VFR BLD AUTO: 39.9 %
HDLC SERPL-MCNC: 54 MG/DL
HDLC SERPL: 19.9 %
HGB BLD-MCNC: 12.7 G/DL
LDLC SERPL CALC-MCNC: 190.6 MG/DL
LYMPHOCYTES # BLD AUTO: 3.1 K/UL
LYMPHOCYTES NFR BLD: 45.2 %
MCH RBC QN AUTO: 26 PG
MCHC RBC AUTO-ENTMCNC: 31.8 G/DL
MCV RBC AUTO: 82 FL
MONOCYTES # BLD AUTO: 0.6 K/UL
MONOCYTES NFR BLD: 8 %
NEUTROPHILS # BLD AUTO: 3.2 K/UL
NEUTROPHILS NFR BLD: 45.8 %
NONHDLC SERPL-MCNC: 218 MG/DL
PLATELET # BLD AUTO: 233 K/UL
PMV BLD AUTO: 9.9 FL
RBC # BLD AUTO: 4.89 M/UL
TRIGL SERPL-MCNC: 137 MG/DL
WBC # BLD AUTO: 6.9 K/UL

## 2018-10-08 PROCEDURE — 85025 COMPLETE CBC W/AUTO DIFF WBC: CPT | Mod: PO

## 2018-10-08 PROCEDURE — 36415 COLL VENOUS BLD VENIPUNCTURE: CPT | Mod: PO

## 2018-10-08 PROCEDURE — 80061 LIPID PANEL: CPT

## 2018-10-10 ENCOUNTER — LAB VISIT (OUTPATIENT)
Dept: LAB | Facility: HOSPITAL | Age: 48
End: 2018-10-10
Attending: NURSE PRACTITIONER
Payer: MEDICAID

## 2018-10-10 DIAGNOSIS — Z79.899 OTHER LONG TERM (CURRENT) DRUG THERAPY: Primary | ICD-10-CM

## 2018-10-10 LAB
ALBUMIN SERPL BCP-MCNC: 4.6 G/DL
ALP SERPL-CCNC: 97 U/L
ALT SERPL W/O P-5'-P-CCNC: 15 U/L
ANION GAP SERPL CALC-SCNC: 8 MMOL/L
AST SERPL-CCNC: 23 U/L
BILIRUB SERPL-MCNC: 0.3 MG/DL
BUN SERPL-MCNC: 13 MG/DL
CALCIUM SERPL-MCNC: 10.2 MG/DL
CHLORIDE SERPL-SCNC: 104 MMOL/L
CHOLEST SERPL-MCNC: 281 MG/DL
CHOLEST/HDLC SERPL: 6.1 {RATIO}
CO2 SERPL-SCNC: 27 MMOL/L
CREAT SERPL-MCNC: 0.66 MG/DL
EST. GFR  (AFRICAN AMERICAN): >60 ML/MIN/1.73 M^2
EST. GFR  (NON AFRICAN AMERICAN): >60 ML/MIN/1.73 M^2
GLUCOSE SERPL-MCNC: 106 MG/DL
HDLC SERPL-MCNC: 46 MG/DL
HDLC SERPL: 16.4 %
LDLC SERPL CALC-MCNC: 200.4 MG/DL
NONHDLC SERPL-MCNC: 235 MG/DL
POTASSIUM SERPL-SCNC: 4.4 MMOL/L
PROT SERPL-MCNC: 8.3 G/DL
SODIUM SERPL-SCNC: 139 MMOL/L
TRIGL SERPL-MCNC: 173 MG/DL
TSH SERPL DL<=0.005 MIU/L-ACNC: 1.23 UIU/ML

## 2018-10-10 PROCEDURE — 36415 COLL VENOUS BLD VENIPUNCTURE: CPT | Mod: PO

## 2018-10-10 PROCEDURE — 84443 ASSAY THYROID STIM HORMONE: CPT | Mod: PO

## 2018-10-10 PROCEDURE — 80053 COMPREHEN METABOLIC PANEL: CPT | Mod: PO

## 2018-10-10 PROCEDURE — 80061 LIPID PANEL: CPT

## 2019-04-16 ENCOUNTER — HOSPITAL ENCOUNTER (EMERGENCY)
Facility: HOSPITAL | Age: 49
Discharge: HOME | End: 2019-04-16
Attending: FAMILY MEDICINE
Payer: MEDICAID

## 2019-04-16 VITALS
SYSTOLIC BLOOD PRESSURE: 135 MMHG | DIASTOLIC BLOOD PRESSURE: 87 MMHG | RESPIRATION RATE: 15 BRPM | HEIGHT: 61 IN | TEMPERATURE: 97 F | BODY MASS INDEX: 24.55 KG/M2 | OXYGEN SATURATION: 97 % | WEIGHT: 130 LBS | HEART RATE: 75 BPM

## 2019-04-16 DIAGNOSIS — M54.50 CHRONIC BILATERAL LOW BACK PAIN WITHOUT SCIATICA: ICD-10-CM

## 2019-04-16 DIAGNOSIS — R42 DIZZINESS: ICD-10-CM

## 2019-04-16 DIAGNOSIS — K64.9 HEMORRHOIDS, UNSPECIFIED HEMORRHOID TYPE: ICD-10-CM

## 2019-04-16 DIAGNOSIS — R51.9 FRONTAL HEADACHE: Primary | ICD-10-CM

## 2019-04-16 DIAGNOSIS — G89.29 CHRONIC BILATERAL LOW BACK PAIN WITHOUT SCIATICA: ICD-10-CM

## 2019-04-16 DIAGNOSIS — R07.9 CHEST PAIN: ICD-10-CM

## 2019-04-16 LAB
ALBUMIN SERPL BCP-MCNC: 4.4 G/DL (ref 3.5–5.2)
ALP SERPL-CCNC: 95 U/L (ref 38–126)
ALT SERPL W/O P-5'-P-CCNC: 14 U/L (ref 10–44)
ANION GAP SERPL CALC-SCNC: 5 MMOL/L (ref 8–16)
AST SERPL-CCNC: 22 U/L (ref 15–46)
B-HCG UR QL: NEGATIVE
BASOPHILS # BLD AUTO: 0.02 K/UL (ref 0–0.2)
BASOPHILS NFR BLD: 0.2 % (ref 0–1.9)
BILIRUB SERPL-MCNC: 0.3 MG/DL (ref 0.1–1)
BILIRUB UR QL STRIP: NEGATIVE
BUN SERPL-MCNC: 14 MG/DL (ref 7–17)
CALCIUM SERPL-MCNC: 10.1 MG/DL (ref 8.7–10.5)
CHLORIDE SERPL-SCNC: 106 MMOL/L (ref 95–110)
CLARITY UR REFRACT.AUTO: CLEAR
CO2 SERPL-SCNC: 30 MMOL/L (ref 23–29)
COLOR UR AUTO: YELLOW
CREAT SERPL-MCNC: 0.83 MG/DL (ref 0.5–1.4)
DIFFERENTIAL METHOD: ABNORMAL
EOSINOPHIL # BLD AUTO: 0.1 K/UL (ref 0–0.5)
EOSINOPHIL NFR BLD: 0.8 % (ref 0–8)
ERYTHROCYTE [DISTWIDTH] IN BLOOD BY AUTOMATED COUNT: 14 % (ref 11.5–14.5)
EST. GFR  (AFRICAN AMERICAN): >60 ML/MIN/1.73 M^2
EST. GFR  (NON AFRICAN AMERICAN): >60 ML/MIN/1.73 M^2
GLUCOSE SERPL-MCNC: 81 MG/DL (ref 70–110)
GLUCOSE UR QL STRIP: NEGATIVE
HCT VFR BLD AUTO: 37 % (ref 37–48.5)
HGB BLD-MCNC: 11.8 G/DL (ref 12–16)
HGB UR QL STRIP: ABNORMAL
KETONES UR QL STRIP: NEGATIVE
LEUKOCYTE ESTERASE UR QL STRIP: NEGATIVE
LYMPHOCYTES # BLD AUTO: 3.3 K/UL (ref 1–4.8)
LYMPHOCYTES NFR BLD: 37.5 % (ref 18–48)
MCH RBC QN AUTO: 26.3 PG (ref 27–31)
MCHC RBC AUTO-ENTMCNC: 31.9 G/DL (ref 32–36)
MCV RBC AUTO: 83 FL (ref 82–98)
MICROSCOPIC COMMENT: NORMAL
MONOCYTES # BLD AUTO: 0.5 K/UL (ref 0.3–1)
MONOCYTES NFR BLD: 5.9 % (ref 4–15)
NEUTROPHILS # BLD AUTO: 4.9 K/UL (ref 1.8–7.7)
NEUTROPHILS NFR BLD: 55.4 % (ref 38–73)
NITRITE UR QL STRIP: NEGATIVE
NT-PROBNP: 96 PG/ML (ref 5–450)
OB PNL STL: POSITIVE
PH UR STRIP: 6 [PH] (ref 5–8)
PLATELET # BLD AUTO: 212 K/UL (ref 150–350)
PMV BLD AUTO: 8.9 FL (ref 9.2–12.9)
POTASSIUM SERPL-SCNC: 3.9 MMOL/L (ref 3.5–5.1)
PROT SERPL-MCNC: 8 G/DL (ref 6–8.4)
PROT UR QL STRIP: NEGATIVE
RBC # BLD AUTO: 4.48 M/UL (ref 4–5.4)
RBC #/AREA URNS AUTO: 1 /HPF (ref 0–4)
SODIUM SERPL-SCNC: 141 MMOL/L (ref 136–145)
SP GR UR STRIP: 1.02 (ref 1–1.03)
TROPONIN I SERPL DL<=0.01 NG/ML-MCNC: <0.012 NG/ML (ref 0.01–0.03)
URN SPEC COLLECT METH UR: ABNORMAL
UROBILINOGEN UR STRIP-ACNC: NEGATIVE EU/DL
WBC # BLD AUTO: 8.82 K/UL (ref 3.9–12.7)

## 2019-04-16 PROCEDURE — 99285 EMERGENCY DEPT VISIT HI MDM: CPT | Mod: 25,ER

## 2019-04-16 PROCEDURE — 63600175 PHARM REV CODE 636 W HCPCS: Mod: ER | Performed by: PHYSICIAN ASSISTANT

## 2019-04-16 PROCEDURE — 84484 ASSAY OF TROPONIN QUANT: CPT | Mod: ER

## 2019-04-16 PROCEDURE — 96374 THER/PROPH/DIAG INJ IV PUSH: CPT | Mod: ER

## 2019-04-16 PROCEDURE — 81025 URINE PREGNANCY TEST: CPT | Mod: ER

## 2019-04-16 PROCEDURE — 85025 COMPLETE CBC W/AUTO DIFF WBC: CPT | Mod: ER

## 2019-04-16 PROCEDURE — 83880 ASSAY OF NATRIURETIC PEPTIDE: CPT | Mod: ER

## 2019-04-16 PROCEDURE — 82272 OCCULT BLD FECES 1-3 TESTS: CPT | Mod: ER

## 2019-04-16 PROCEDURE — 93010 ELECTROCARDIOGRAM REPORT: CPT | Mod: ,,, | Performed by: INTERNAL MEDICINE

## 2019-04-16 PROCEDURE — 81000 URINALYSIS NONAUTO W/SCOPE: CPT | Mod: ER

## 2019-04-16 PROCEDURE — 96361 HYDRATE IV INFUSION ADD-ON: CPT | Mod: ER

## 2019-04-16 PROCEDURE — 93010 EKG 12-LEAD: ICD-10-PCS | Mod: ,,, | Performed by: INTERNAL MEDICINE

## 2019-04-16 PROCEDURE — 25000003 PHARM REV CODE 250: Mod: ER | Performed by: PHYSICIAN ASSISTANT

## 2019-04-16 PROCEDURE — 93005 ELECTROCARDIOGRAM TRACING: CPT | Mod: ER

## 2019-04-16 PROCEDURE — 80053 COMPREHEN METABOLIC PANEL: CPT | Mod: ER

## 2019-04-16 RX ORDER — ESCITALOPRAM OXALATE 10 MG/1
10 TABLET ORAL DAILY
COMMUNITY

## 2019-04-16 RX ORDER — CHLORZOXAZONE 500 MG/1
500 TABLET ORAL 4 TIMES DAILY PRN
COMMUNITY

## 2019-04-16 RX ORDER — KETOROLAC TROMETHAMINE 30 MG/ML
15 INJECTION, SOLUTION INTRAMUSCULAR; INTRAVENOUS
Status: COMPLETED | OUTPATIENT
Start: 2019-04-16 | End: 2019-04-16

## 2019-04-16 RX ORDER — ARIPIPRAZOLE 5 MG/1
5 TABLET ORAL DAILY
COMMUNITY

## 2019-04-16 RX ORDER — HYDROCORTISONE 25 MG/G
CREAM TOPICAL 2 TIMES DAILY
Qty: 3.5 G | Refills: 0 | Status: SHIPPED | OUTPATIENT
Start: 2019-04-16

## 2019-04-16 RX ORDER — HYDROCODONE BITARTRATE AND ACETAMINOPHEN 7.5; 325 MG/1; MG/1
1 TABLET ORAL EVERY 6 HOURS PRN
COMMUNITY

## 2019-04-16 RX ORDER — CYPROHEPTADINE HYDROCHLORIDE 4 MG/1
4 TABLET ORAL 3 TIMES DAILY PRN
COMMUNITY

## 2019-04-16 RX ORDER — DICLOFENAC SODIUM 10 MG/G
2 GEL TOPICAL 4 TIMES DAILY
Qty: 100 G | Refills: 0 | Status: SHIPPED | OUTPATIENT
Start: 2019-04-16

## 2019-04-16 RX ORDER — DOCUSATE SODIUM 100 MG/1
100 CAPSULE, LIQUID FILLED ORAL 2 TIMES DAILY PRN
Qty: 60 CAPSULE | Refills: 0 | Status: SHIPPED | OUTPATIENT
Start: 2019-04-16

## 2019-04-16 RX ADMIN — KETOROLAC TROMETHAMINE 15 MG: 30 INJECTION, SOLUTION INTRAMUSCULAR at 08:04

## 2019-04-16 RX ADMIN — SODIUM CHLORIDE 1000 ML: 0.9 INJECTION, SOLUTION INTRAVENOUS at 08:04

## 2019-04-17 NOTE — DISCHARGE INSTRUCTIONS
You are advised to follow up with your primary care provider for re-evaluation within 3 days.  You are instructed to return to the emergency department immediately for any new or worsening symptoms.

## 2019-04-17 NOTE — ED PROVIDER NOTES
Encounter Date: 4/16/2019       History     Chief Complaint   Patient presents with    Migraine     patient c/o intermittent frontal migraine x 1 week with dizziness    Spasms     c/o lower back muscle spasms since yesterday. No recent injury. Reports chronic back pain.     48-year-old female presents to the emergency department for evaluation of 1 week history of frontal headache, dizziness, low back pain, muscle spasms, and intermittent right-sided chest pain.  She reports that the symptoms began gradually 1 week ago and have been intermittent since onset.  She reports that the chest pain is a mild, achy pain in the right side of her chest that last for several minutes at a time prior to resolving.  She denies any shortness of breath, cough, palpitations, nausea, vomiting or sweating when the chest pain is present.  She reports a constant, achy, pain in her low back with intermittent spasms.  She states that this is a chronic issue she has been dealing with for years.  She denies any pain to her neck, upper back or lower extremities. She denies any numbness, tingling, weakness or swelling to the lower extremities.  Denies any bowel or bladder incontinence.  She denies any known trauma. She states that she has chronic pain in her low back.  She states that she has an intermittent frontal headache which is causing her mild dizziness over the last week.  Denies any vision changes, fever, ear pain, tinnitus, neck pain or vomiting. She reports that she also has chronic blood in her stool.  She states that this has been going on for several years and her doctors are well aware of this finding.  They keep telling her that it is not concerning.  She reports only small amounts of bright red blood on the toilet paper or on the stool when she has a bowel movement.  She reports that she has been dealing with chronic constipation for several years.  States that her doctors told her to increase her fiber but that has not been  helping.  Reports her last bowel movement was yesterday.  She has attempted treatment at home with Tylenol with no relief of symptoms.        Review of patient's allergies indicates:  No Known Allergies  Past Medical History:   Diagnosis Date    High cholesterol     Hypertension     Ovarian cyst      Past Surgical History:   Procedure Laterality Date    ABDOMINAL SURGERY      COLONOSCOPY Golytely N/A 2018    [facility]    ESOPHAGOGASTRODUODENOSCOPY (EGD) N/A 2018    Performed by [facility] at [facility] END[facility]    oophorectomy       Family History   Problem Relation Age of Onset    Hypertension Mother     Diabetes Mother     Heart disease Mother          of MI    Liver disease Mother         failure    Heart disease Sister     Hypertension Brother     Diabetes Brother     Heart disease Sister      Social History     Tobacco Use    Smoking status: Current Every Day Smoker     Packs/day: 0.50     Years: 30.00     Pack years: 15.00     Types: Cigarettes    Smokeless tobacco: Never Used   Substance Use Topics    Alcohol use: Yes     Comment: social ~1/week no more than 4-5 drinks/occasion    Drug use: Yes     Types: Marijuana     Comment: former THC     Review of Systems   Constitutional: Negative for activity change, appetite change and fever.   HENT: Negative for congestion, ear discharge, nosebleeds, postnasal drip, rhinorrhea, sinus pressure, sore throat, trouble swallowing and voice change.    Eyes: Negative for photophobia and visual disturbance.   Respiratory: Negative for cough, chest tightness, shortness of breath and wheezing.    Cardiovascular: Positive for chest pain. Negative for palpitations and leg swelling.   Gastrointestinal: Positive for blood in stool and constipation. Negative for abdominal pain, diarrhea, nausea, rectal pain and vomiting.   Genitourinary: Negative for decreased urine volume, dysuria, flank pain, hematuria and pelvic  pain.   Musculoskeletal: Positive for back pain. Negative for joint swelling, neck pain and neck stiffness.   Skin: Negative for rash.   Neurological: Positive for dizziness and headaches. Negative for syncope, weakness, light-headedness and numbness.       Physical Exam     Initial Vitals [04/16/19 1833]   BP Pulse Resp Temp SpO2   (!) 138/92 98 18 97 °F (36.1 °C) 97 %      MAP       --         Physical Exam    Nursing note and vitals reviewed.  Constitutional: She appears well-developed and well-nourished. She is not diaphoretic. No distress.   HENT:   Head: Normocephalic and atraumatic.   Right Ear: External ear normal.   Left Ear: External ear normal.   Nose: Nose normal.   Mouth/Throat: Oropharynx is clear and moist.   Eyes: Conjunctivae and EOM are normal. Pupils are equal, round, and reactive to light.   Neck: Normal range of motion. Neck supple.   Cardiovascular: Normal rate, regular rhythm and normal heart sounds. Exam reveals no gallop and no friction rub.    No murmur heard.  Pulmonary/Chest: Breath sounds normal. No respiratory distress. She has no wheezes. She has no rhonchi. She has no rales. She exhibits no tenderness.   Abdominal: Soft. Bowel sounds are normal. She exhibits no distension. There is no tenderness. There is no rebound.   Genitourinary:   Genitourinary Comments: Small external hemorrhoid noted. No active bleeding noted.  Hemorrhoid is not thrombosed.   Musculoskeletal:        Right hip: She exhibits normal range of motion, normal strength and no tenderness.        Left hip: She exhibits normal range of motion, normal strength and no tenderness.        Cervical back: She exhibits normal range of motion, no tenderness and no bony tenderness.        Thoracic back: She exhibits normal range of motion, no tenderness and no bony tenderness.        Lumbar back: She exhibits tenderness. She exhibits normal range of motion, no bony tenderness, no swelling and no edema.   Lymphadenopathy:     She  has no cervical adenopathy.   Neurological: She is alert and oriented to person, place, and time. No cranial nerve deficit.   Skin: Skin is warm and dry.   Psychiatric: She has a normal mood and affect.         ED Course   Procedures  Labs Reviewed   CBC W/ AUTO DIFFERENTIAL - Abnormal; Notable for the following components:       Result Value    Hemoglobin 11.8 (*)     MCH 26.3 (*)     MCHC 31.9 (*)     MPV 8.9 (*)     All other components within normal limits   COMPREHENSIVE METABOLIC PANEL - Abnormal; Notable for the following components:    CO2 30 (*)     Anion Gap 5 (*)     All other components within normal limits   PREGNANCY TEST, URINE RAPID   TROPONIN I   URINALYSIS, REFLEX TO URINE CULTURE   NT-PRO NATRIURETIC PEPTIDE   OCCULT BLOOD X 1, STOOL     EKG Readings: (Independently Interpreted)   Initial Reading: No STEMI. Rhythm: Normal Sinus Rhythm. Heart Rate: 95.       Imaging Results    None          Medical Decision Making:   Initial Assessment:   48-year-old female presents for evaluation of frontal headache, dizziness, chest pain, chronic low back pain and chronic blood in her stool.  Physical exam reveals a nontoxic-appearing female in no acute distress. Patient is afebrile vital signs within normal limits.  Neurological exam reveals an alert and oriented patient.  No cranial nerve deficits noted.  Neck is supple, no meningeal signs noted. Lungs clear to auscultation bilaterally moved through distress or accessory muscle use noted.  Abdominal exam reveals soft abdomen, nontender palpation. No CVA tenderness noted. Rectal exam reveals Small external hemorrhoid noted. No active bleeding noted.  Hemorrhoid is not thrombosed.  Mild tenderness to palpation noted over the paraspinal musculature of the lumbar spine.  No spinous process tenderness noted. No bony instability or step-offs noted.  Full range of motion, sensation and peripheral pulses intact in lower extremities bilaterally.  Differential Diagnosis:    I carefully considered but doubt serious intracranial etiology including stroke, hemorrhage or space-occupying mass. No imaging it did at this time.  Migraine  Frontal headache  Chronic back pain  I carefully considered but doubt serious etiology of lumbar back pain including fracture, dislocation or cauda equina syndrome.  No imaging indicated at this time.  Hemorrhoid    ED Management:  EKG reveals no acute ST changes.  CBC reveals no acute leukocytosis or anemia.  CMP results within normal limits. UPT negative. Patient given Toradol and saline with complete relief of symptoms. Troponin less than 0.012.  BNP 96.  Urinalysis reveals no evidence of UTI.  Hemoccult positive. Will prescribed Anusol and Colace for symptom control.  Instructed patient to follow up with her primary care provider for re-evaluation and to return to the emergency department immediately for any new or worsening symptoms.  Discussed this patient at length with  who is in agreement with the course of treatment.                      Clinical Impression:       ICD-10-CM ICD-9-CM   1. Frontal headache R51 784.0   2. Chest pain R07.9 786.50   3. Dizziness R42 780.4   4. Hemorrhoids, unspecified hemorrhoid type K64.9 455.6   5. Chronic bilateral low back pain without sciatica M54.5 724.2    G89.29 338.29                                Luz Chang PA-C  04/16/19 2053

## 2019-07-04 ENCOUNTER — HOSPITAL ENCOUNTER (EMERGENCY)
Facility: HOSPITAL | Age: 49
Discharge: HOME | End: 2019-07-04
Attending: SURGERY
Payer: MEDICAID

## 2019-07-04 VITALS
HEART RATE: 80 BPM | SYSTOLIC BLOOD PRESSURE: 135 MMHG | DIASTOLIC BLOOD PRESSURE: 97 MMHG | RESPIRATION RATE: 20 BRPM | TEMPERATURE: 99 F | HEIGHT: 61 IN | BODY MASS INDEX: 24.55 KG/M2 | WEIGHT: 130 LBS | OXYGEN SATURATION: 99 %

## 2019-07-04 DIAGNOSIS — M54.12 RADICULOPATHY OF CERVICAL SPINE: ICD-10-CM

## 2019-07-04 DIAGNOSIS — M54.16 RADICULOPATHY OF LUMBAR REGION: ICD-10-CM

## 2019-07-04 DIAGNOSIS — M54.32 CHRONIC SCIATICA, LEFT: ICD-10-CM

## 2019-07-04 DIAGNOSIS — G62.9 NEUROPATHY: Primary | ICD-10-CM

## 2019-07-04 PROCEDURE — 99284 EMERGENCY DEPT VISIT MOD MDM: CPT | Mod: 25,ER

## 2019-07-04 PROCEDURE — 25000003 PHARM REV CODE 250: Mod: ER | Performed by: SURGERY

## 2019-07-04 PROCEDURE — 96372 THER/PROPH/DIAG INJ SC/IM: CPT | Mod: ER

## 2019-07-04 PROCEDURE — 63600175 PHARM REV CODE 636 W HCPCS: Mod: ER | Performed by: SURGERY

## 2019-07-04 RX ORDER — BUTALBITAL, ACETAMINOPHEN, CAFFEINE AND CODEINE PHOSPHATE 300; 50; 40; 30 MG/1; MG/1; MG/1; MG/1
1 CAPSULE ORAL EVERY 4 HOURS
Qty: 15 CAPSULE | Refills: 0 | Status: SHIPPED | OUTPATIENT
Start: 2019-07-04

## 2019-07-04 RX ORDER — HYDROCODONE BITARTRATE AND ACETAMINOPHEN 10; 325 MG/1; MG/1
1 TABLET ORAL
Status: COMPLETED | OUTPATIENT
Start: 2019-07-04 | End: 2019-07-04

## 2019-07-04 RX ORDER — DEXAMETHASONE SODIUM PHOSPHATE 4 MG/ML
8 INJECTION, SOLUTION INTRA-ARTICULAR; INTRALESIONAL; INTRAMUSCULAR; INTRAVENOUS; SOFT TISSUE
Status: COMPLETED | OUTPATIENT
Start: 2019-07-04 | End: 2019-07-04

## 2019-07-04 RX ADMIN — DEXAMETHASONE SODIUM PHOSPHATE 8 MG: 4 INJECTION INTRA-ARTICULAR; INTRALESIONAL; INTRAMUSCULAR; INTRAVENOUS; SOFT TISSUE at 07:07

## 2019-07-04 RX ADMIN — HYDROCODONE BITARTRATE AND ACETAMINOPHEN 1 TABLET: 10; 325 TABLET ORAL at 07:07

## 2019-07-04 NOTE — ED PROVIDER NOTES
"Encounter Date: 2019       History     Chief Complaint   Patient presents with    Muscle Pain     PT reports left flank pain that radiates down left leg for "a long time". PT also reports left thumb pain x 2 months. Pt reports right neck pain that radiates down right shoulder to chest.      Patient presents with multiple complaints. Main complainst in order of her history are left-sided neck and back pain with radiation down her arm and her leg and her buttock, right neck pain with pain down her right arm and left thumb pain.  All this pain is intermittent and has been present for several months with no recent history of trauma. She denies no loss of sensation or muscle weakness or loss of motion. She denies loss of bladder or bowel function    The history is provided by the patient.   Leg Pain    There was no injury mechanism. The incident occurred several weeks ago. The pain is present in the left hip and left knee. The quality of the pain is described as aching. The pain is at a severity of 10/10. The pain has been intermittent since onset. Pertinent negatives include no numbness, no inability to bear weight, no loss of motion, no muscle weakness, no loss of sensation and no tingling. She reports no foreign bodies present. The symptoms are aggravated by activity. She has tried nothing for the symptoms. The treatment provided no relief.     Review of patient's allergies indicates:  No Known Allergies  Past Medical History:   Diagnosis Date    High cholesterol     Hypertension     Ovarian cyst      Past Surgical History:   Procedure Laterality Date    ABDOMINAL SURGERY      COLONOSCOPY Golytely N/A 2018    Performed by [facility] at [facility]    ESOPHAGOGASTRODUODENOSCOPY (EGD) N/A 2018    Performed by [facility] at [facility]    oophorectomy       Family History   Problem Relation Age of Onset    Hypertension Mother     Diabetes Mother     Heart disease Mother          " of MI    Liver disease Mother         failure    Heart disease Sister     Hypertension Brother     Diabetes Brother     Heart disease Sister      Social History     Tobacco Use    Smoking status: Current Some Day Smoker     Packs/day: 0.50     Years: 30.00     Pack years: 15.00     Types: Cigarettes    Smokeless tobacco: Never Used   Substance Use Topics    Alcohol use: Yes     Comment: socailly    Drug use: Yes     Types: Marijuana     Comment: former THC     Review of Systems   Constitutional: Negative.    HENT: Negative.    Eyes: Negative.    Respiratory: Negative.    Cardiovascular: Negative.    Gastrointestinal: Negative.    Endocrine: Negative.    Genitourinary: Negative.    Musculoskeletal: Positive for arthralgias, myalgias and neck pain.   Skin: Negative.    Allergic/Immunologic: Negative.    Neurological: Negative.  Negative for tingling and numbness.   Hematological: Negative.    Psychiatric/Behavioral: Negative.        Physical Exam     Initial Vitals [07/04/19 0715]   BP Pulse Resp Temp SpO2   (!) 135/97 80 20 98.6 °F (37 °C) 99 %      MAP       --         Physical Exam    Nursing note and vitals reviewed.  Constitutional: She appears well-developed and well-nourished.   HENT:   Head: Normocephalic.   Eyes: Conjunctivae are normal.   Neck: Normal range of motion.   Cardiovascular: Normal rate.   Pulmonary/Chest: Breath sounds normal.   Abdominal: Bowel sounds are normal.   Musculoskeletal:        Arms:  Neurological: She is alert and oriented to person, place, and time. She has normal strength. GCS score is 15. GCS eye subscore is 4. GCS verbal subscore is 5. GCS motor subscore is 6.   NIH stroke scale 0   Skin: Skin is warm.   Psychiatric: She has a normal mood and affect.         ED Course   Procedures  Labs Reviewed - No data to display       Imaging Results    None          Medical Decision Making:   Initial Assessment:   Pain secondary to neuropathy probable DJD  ED Management:  Physical  exam does not show any acute abnormality.  She needs chronic pain management.  There is no acute disability.  Recommend follow-up with further imaging is needed                      Clinical Impression:       ICD-10-CM ICD-9-CM   1. Neuropathy G62.9 355.9   2. Chronic sciatica, left M54.32 724.3   3. Radiculopathy of cervical spine M54.12 723.4   4. Radiculopathy of lumbar region M54.16 724.4         Disposition:   Disposition: Discharged  Condition: Stable                        DIAZ Hutchins III, MD  07/04/19 0809

## 2019-07-04 NOTE — ED TRIAGE NOTES
"PT reports left flank pain that radiates down left leg for "a long time". PT also reports left thumb pain x 2 months. Pt reports right neck pain that radiates down right shoulder to chest.   "

## 2019-07-25 DIAGNOSIS — M54.50 LOW BACK PAIN: Primary | ICD-10-CM

## 2019-08-14 ENCOUNTER — CLINICAL SUPPORT (OUTPATIENT)
Dept: REHABILITATION | Facility: HOSPITAL | Age: 49
End: 2019-08-14
Payer: MEDICAID

## 2019-08-14 DIAGNOSIS — M62.9 HAMSTRING TIGHTNESS OF BOTH LOWER EXTREMITIES: ICD-10-CM

## 2019-08-14 DIAGNOSIS — M53.86 DECREASED RANGE OF MOTION OF LUMBAR SPINE: ICD-10-CM

## 2019-08-14 DIAGNOSIS — R29.898 WEAKNESS OF BOTH HIPS: ICD-10-CM

## 2019-08-14 PROCEDURE — 97110 THERAPEUTIC EXERCISES: CPT | Mod: PO

## 2019-08-14 PROCEDURE — 97162 PT EVAL MOD COMPLEX 30 MIN: CPT | Mod: PO

## 2019-08-14 NOTE — PLAN OF CARE
"[facility]  Physical Therapy Initial Evaluation    Name: Sonia Hicks  [facility] Number: [id number]    Therapy Diagnosis:   Encounter Diagnoses   Name Primary?    Decreased range of motion of lumbar spine     Hamstring tightness of both lower extremities     Weakness of both hips      Physician: Cynthia Gill, NP    Physician Orders: PT Eval and Treat   Medical Diagnosis from Referral: Low back pain  Evaluation Date: 8/14/2019  Authorization Period Expiration: 08/31/2019  Plan of Care Expiration: 10/14/2019  Visit # / Visits authorized: 1/ 12    Time In: 9:00 am  Time Out: 10:00 am  Total Billable Time: 60 minutes    Precautions: Standard and Fall    Subjective   Date of onset: chronic, exacerbation of symptoms 7/24/19  History of current condition - Sonia reports: She has been having back pain since she fell in a puddle of water at work 4 years ago. She reports that her pain "don't want to go away and like it is getting worser". She reports hurting more when bending, walking her L leg gives out, sitting too long (10 minutes) increases the pain in her buttocks, when performing her usual household duties she gets tired easily and has to rest as her strength goes and left side aches more. About a month ago her L leg went out and she was able to grab onto something to keep from falling.  She also has difficulty stepping over the side of the tub but she is able to shower and soak in the tub, but she has to ease herself down in the tub. She has increase pain when driving and when getting in/out of her vehicle. She has increased pain when assisting her 8 and 7 year old children with ADLs. 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 Her doctor told her it is only going to get worse and she will end up needing surgery.      Medical History:   Past Medical History:   Diagnosis Date    High cholesterol     Hypertension     " "Ovarian cyst        Surgical History:   Sonia Hicks  has a past surgical history that includes Abdominal surgery; oophorectomy; and Colonoscopy (N/A, 4/19/2018).    Medications:   Sonia has a current medication list which includes the following prescription(s): aripiprazole, butalbital-acetaminop-caf-cod, chlorzoxazone, cyproheptadine, diclofenac sodium, docusate sodium, escitalopram oxalate, hydrocodone-acetaminophen, and hydrocortisone.    Allergies:   Review of patient's allergies indicates:  No Known Allergies     Imaging, x-rays: Lumbar Spine 6/20/2018  FINDINGS:  3 views of the lumbar spine and thoracic spine.    Overall alignment is well maintained.  No evidence of spondylolysis or spondylolisthesis. Disk and vertebral body heights are normal.  Visualized lungs are clear.  Mild constipation.      Impression       See above.     Prior Therapy: PT previously for back approximately 8-9 months ago  Social History: single story home, one small step in front of the home,  lives with their family  Occupation:security  Prior Level of Function: independent  Current Level of Function: modified independent    Pain:  Current 9/10, worst 10/10, best 8/10   Location: bilateral back  and L LE to ankle   Description: Aching and thumping, "feels funny like a nerve", "I can just feel it"  Aggravating Factors: Sitting, Standing, Bending, Walking and Lifting  Easing Factors: ice, heating pad, injection, hot bath and TENS unit    Pts goals: to make it go away    Objective     Observation: Patient is a 49 year old female, who presents to the clinic in no apparent distress. She is ambulating inside the clinic with a normal gait pattern. She was observed getting into her vehicle with no difficulty.     Posture:  Increased lumbar lordosis in standing, in sitting she has increased WB on R hip and frequently rubbing her L hip. In supine her R LE longer.    Lumbar Range of Motion:    Degrees Pain   Flexion 45 Pulling in hamstrings and " buttocks   Extension 5 Pulling down lumbar region   Left Side Bending 25 L QL   Right Side Bending 30 Across lumbar        Lower Extremity Strength  Right LE  Left LE    Knee extension: 5/5 Knee extension: 5/5   Knee flexion: 5/5 Knee flexion: 4-/5 aching pain   Hip flexion: 4-/5 pain R glute Hip flexion: 4-/5   Hip extension:  4/5 Hip extension: 4/5   Hip abduction: 4/5 Hip abduction: 4-/5   Hip adduction: 4/5 Hip adduction 5/5   Ankle dorsiflexion: 5/5 Ankle dorsiflexion: 5/5   Ankle plantarflexion: 5/5 Ankle plantarflexion: 5/5     Special Tests:  -Repeated Flexion: no change  -Repeated Ext: no change  -MARTI L: positive  -Thigh thrust negative B    DTR:   Right Left Comment   Patellar (L3-4) 1+ 1+    Achilles (S1) 1+ 1+        Neuro Dynamic Testing:    Sciatic nerve:      SLR: R = 70 degrees pulling in leg     L = 60 degrees pulling in back      Joint Mobility: lumbar PIVM WNL, but patient reports pain with L rotation     Palpation: tenderness to palpation over L SI joint > R SI joint, L QL, R piriformis > L piriformis, L glute med    Sensation: reports decreased light touch over L LE all dermatomes    Flexibility:   Popliteal Angle: R = 30 degrees ; L = 30 degrees   Estiven's test: R = positive; L = positive complaints of pain with R and L     CMS Impairment/Limitation/Restriction for FOTO Lumbar Spine Survey    Therapist reviewed FOTO scores for Sonia Hicks on 8/14/2019.   FOTO documents entered into [employer] - see Media section.    Limitation Score: 76%  Category: Mobility    Current : CL = least 60% but < 80% impaired, limited or restricted  Goal: CK = at least 40% but < 60% impaired, limited or restricted  Discharge: N/A at this time         TREATMENT   Treatment Time In: 9:50 am  Treatment Time Out: 10:00 am  Total Treatment time separate from Evaluation: 10 minutes    Sonia received therapeutic exercises to develop core stabilization for 10 minutes including:  TAs, push/pull    Home Exercises and Patient  Education Provided    Education provided:   - compliance with HEP  - role of PT and goals for PT    Written Home Exercises Provided: yes.  Exercises were reviewed and Sonia was able to demonstrate them prior to the end of the session.  Sonia demonstrated good  understanding of the education provided.     See EMR under Patient Instructions for exercises provided 8/14/2019.    Assessment   Sonia is a 49 y.o. female referred to outpatient Physical Therapy with a medical diagnosis of  Low back pain. Pt presents with decreased lumbar range of motion, weakness of both hips, and tightness of both hips that contributes to her complaints of pain with ADLs, transfers, prolonged walking, prolonged sitting, prolonged standing, performing her usual household duties, and assisting her young children with ADLs. She tested positive for L SI joint involvement with MARTI and her R LE was longer in supine. Her current score on FOTO Lumbar Spine Survey places her in the 60%<80% impaired, limited, or restricted category.     Pt prognosis is Fair.   Pt will benefit from skilled outpatient Physical Therapy to address the deficits stated above and in the chart below, provide pt/family education, and to maximize pt's level of independence.     Plan of care discussed with patient: Yes  Pt's spiritual, cultural and educational needs considered and patient is agreeable to the plan of care and goals as stated below:     Anticipated Barriers for therapy: none    Medical Necessity is demonstrated by the following  History  Co-morbidities and personal factors that may impact the plan of care Co-morbidities:   level of undertstanding of current condition and chronic low back pain    Personal Factors:   attitudes     moderate   Examination  Body Structures and Functions, activity limitations and participation restrictions that may impact the plan of care Body Regions:   back  lower extremities    Body Systems:     ROM  strength  flexibility    Participation Restrictions:   Difficulty with prolonged walking, prolonged standing, prolonged sitting, transfers, household duties, caring for her children, and usual ADLs.     Activity limitations:   Learning and applying knowledge  no deficits    General Tasks and Commands  no deficits    Communication  no deficits    Mobility  lifting and carrying objects  walking  driving (bike, car, motorcycle)    Self care  no deficits    Domestic Life  shopping  cooking  doing house work (cleaning house, washing dishes, laundry)  assisting others    Interactions/Relationships  no deficits    Life Areas  no deficits    Community and Social Life  community life  recreation and leisure         moderate   Clinical Presentation evolving clinical presentation with changing clinical characteristics moderate   Decision Making/ Complexity Score: moderate     Goals:  Short Term Goals: 4 weeks   1. This patient will be independent with a basic HEP.  2. This patient will increase lumbar range of motion by 10 degrees in order to be able to assist her children with ADLs with no increase in symptoms.   3. This patient will increase B LE strength by 1/2 grade in order to be able to perform vehicle transfers with no increase in symptoms.   4. This patient will have a pain rating of 6/10 at worst with ADLs.  5. Patient able to score greater than or equal to 34 on the FOTO Lumbar Spine Survey placing patient in 60%<80% impaired, limited, or restricted category demonstrating overall decreased low back pain with functional activities.  Long Term Goals: 8 weeks   1. This patient will be independent with an updated HEP.  2. This patient will increase B LE strength to 5/5 in order to be able to perform her usual household duties with no increase in symptoms.   3. This patient will have a pain rating of 3/10 at worst with ADLs.  4. Patient able to score greater than or equal to 44 on the FOTO Lumbar Spine Survey  placing patient in 40%<60% impaired, limited, or restricted category demonstrating overall decreased low back pain with functional activities.    Plan   Plan of care Certification: 8/14/2019 to 10/14/2019.    Outpatient Physical Therapy 2 times weekly for 8 weeks to include the following interventions: Electrical Stimulation TENS, IFC, Manual Therapy, Moist Heat/ Ice, Neuromuscular Re-ed, Patient Education, Therapeutic Exercise and IASTM. Dry needling with manual therapy techniques to decrease pain, inflammation and swelling, increase circulation and promote healing process.     Poornima Cruz, PT

## 2019-08-14 NOTE — PATIENT INSTRUCTIONS
SI joint Muscle energy for L Posterior/R anterior rotation    Bring both knees up towards your chest as shown in the picture.  Place your Left hand on top of your Left thigh, and your Right hand on the back of your Right thigh.  Push your legs into each hand with about 50% effort.  Hold for 5 seconds. Repeat 3 times. Can be done in a seated position, pushing R foot into the floor.        Si Joint Muscle Energy Symphysis Pubis Reset    Bring both knees up towards your chest as shown in the picture.  Place your hands between your knees.  Squeeze your legs together and hold for 5 seconds.  Relax and then repeat 3 times.    Isometric Stabilization        Tighten abdominal muscles as if tightening a belt. Hold 5 seconds.  Do 10 times, 2 times per day.     [email or web address]     Copyright © [employer]. All rights reserved.

## 2019-08-18 PROBLEM — R29.898 WEAKNESS OF BOTH HIPS: Status: ACTIVE | Noted: 2019-08-18

## 2019-08-18 PROBLEM — M53.86 DECREASED RANGE OF MOTION OF LUMBAR SPINE: Status: ACTIVE | Noted: 2019-08-18

## 2019-08-18 PROBLEM — M62.9 HAMSTRING TIGHTNESS OF BOTH LOWER EXTREMITIES: Status: ACTIVE | Noted: 2019-08-18

## 2019-08-19 ENCOUNTER — CLINICAL SUPPORT (OUTPATIENT)
Dept: REHABILITATION | Facility: HOSPITAL | Age: 49
End: 2019-08-19
Payer: MEDICAID

## 2019-08-19 DIAGNOSIS — R29.898 WEAKNESS OF BOTH HIPS: ICD-10-CM

## 2019-08-19 DIAGNOSIS — M62.9 HAMSTRING TIGHTNESS OF BOTH LOWER EXTREMITIES: ICD-10-CM

## 2019-08-19 DIAGNOSIS — M53.86 DECREASED RANGE OF MOTION OF LUMBAR SPINE: ICD-10-CM

## 2019-08-19 PROCEDURE — 97110 THERAPEUTIC EXERCISES: CPT | Mod: PO

## 2019-08-19 NOTE — PATIENT INSTRUCTIONS
Supine: Leg Stretch With Strap (Intermediate)        Lie on back with one knee bent, foot flat on floor. Hook strap around other foot. Straighten knee. Raise leg further toward its maximal range. Hold 10 seconds. Relax leg completely down to floor.   Repeat 10 times per session. Do 2 sessions per day.    Copyright © "[employer]I. All rights reserved.   Piriformis Stretch, Supine        Lie supine, legs bent, feet flat. Raise one bent leg and, grasping knee with both hands, pull leg toward opposite shoulder. Hold 10 seconds.   Repeat 10 times per session. Do 2 sessions per day.     Copyright © "[employer]I. All rights reserved.   Bent Leg Lift (Hook-Lying)        Tighten stomach and slowly raise right leg from floor. Keep trunk rigid. Repeat with the left leg.  Repeat 10 times per set. Do 3 sets per session. Do 2 sessions per day.     [email or web address]://[email or web address].[email or web address]/1090     Copyright © "[employer]I. All rights reserved.   Bridging        Slowly raise buttocks from floor, keeping stomach tight.  Repeat 10 times per set. Do 3 sets per session. Do 2 sessions per day.     [email or web address].exer.us/1096     Copyright © "[employer]I. All rights reserved.   Supine With Rotation        Lie, back flat, legs bent, feet together. Rotate knees to one side. Hold 2 seconds. Repeat to other side.  Repeat 10 times per session. Do 2 sessions per day.    Copyright © "[employer]I. All rights reserved.

## 2019-08-19 NOTE — PROGRESS NOTES
"  Physical Therapy Daily Treatment Note     Name: Sonia Hicks  Clinic Number: [id number]    Therapy Diagnosis:   Encounter Diagnoses   Name Primary?    Decreased range of motion of lumbar spine     Hamstring tightness of both lower extremities     Weakness of both hips      Physician: Cynthia Gill NP    Visit Date: 8/19/2019    Physician Orders: PT Eval and Treat   Medical Diagnosis from Referral: Low back pain  Evaluation Date: 8/14/2019  Authorization Period Expiration: 08/31/2019  Plan of Care Expiration: 10/14/2019  Visit # / Visits authorized: 2/ 12    Time In: 8:00 am  Time Out: 8:35 am  Total Billable Time: 35 minutes    Precautions: Standard and Fall    Subjective     Pt reports: she trying to do her exercises.  She was not compliant with home exercise program.  Response to previous treatment: no soreness after the initial evaluation.   Functional change: none    Pain: 8/10  Location: left back  and buttocks      Objective     Sonia received therapeutic exercises to develop strength, flexibility and core stabilization for 35 minutes including:    Sonia presented to the clinic with a level pelvis.     Date 08/19/19   Visit 2/12   FTF 9/14/19   FOTO 2/5   POC exp 10/14/19       MHP --   MT --   Hamstring str 10 x 10"   Piriformis str 10 x 10"   Supine:    TAs 10 x 5"   LTR 1 x 10   March 1 x 10   Bridge 1 x 10   SLR 1 x 10   Prone:    Prone prop Next?   Alt LE/UE --   Seated:    March Next?   LAQs --   Lats --   Rows --           Initials DG       Home Exercises Provided and Patient Education Provided     Education provided:   - performing her HEP on a regular basis.   - correct body mechanics with supine to sit(rolling to her side and pushing up)    Written Home Exercises Provided: yes.  Exercises were reviewed and Sonia was able to demonstrate them prior to the end of the session.  Sonia demonstrated fair  understanding of the education provided.     See EMR under Patient Instructions for exercises provided " 8/19/2019.    Assessment     Sonia required frequent verbal and tactile cues to not hold at the end range of the movement with SLR, bridging, or marching exercises. She was able to begin making progress towards her goals as she was able to perform all of today's exercises with no increase in symptoms prior to leaving the clinic. She was able to transfer on/off the high exercise plinth and ambulate in the clinic with a normal gait pattern and no complaints of pain.   Sonia is progressing well towards her goals.   Pt prognosis is Fair.     Pt will continue to benefit from skilled outpatient physical therapy to address the deficits listed in the problem list box on initial evaluation, provide pt/family education and to maximize pt's level of independence in the home and community environment.     Pt's spiritual, cultural and educational needs considered and pt agreeable to plan of care and goals.     Anticipated barriers to physical therapy: none    Goals:   Short Term Goals: 4 weeks   1. This patient will be independent with a basic HEP.  2. This patient will increase lumbar range of motion by 10 degrees in order to be able to assist her children with ADLs with no increase in symptoms.   3. This patient will increase B LE strength by 1/2 grade in order to be able to perform vehicle transfers with no increase in symptoms.   4. This patient will have a pain rating of 6/10 at worst with ADLs.  5. Patient able to score greater than or equal to 34 on the FOTO Lumbar Spine Survey placing patient in 60%<80% impaired, limited, or restricted category demonstrating overall decreased low back pain with functional activities.  Long Term Goals: 8 weeks   1. This patient will be independent with an updated HEP.  2. This patient will increase B LE strength to 5/5 in order to be able to perform her usual household duties with no increase in symptoms.   3. This patient will have a pain rating of 3/10 at worst with ADLs.  4. Patient able  to score greater than or equal to 44 on the FOTO Lumbar Spine Survey placing patient in 40%<60% impaired, limited, or restricted category demonstrating overall decreased low back pain with functional activities.    Plan     Continue with the plan of care and begin seated hip flexion and LAQ next visit.     Poornima Cruz, PT

## 2019-08-22 ENCOUNTER — CLINICAL SUPPORT (OUTPATIENT)
Dept: REHABILITATION | Facility: HOSPITAL | Age: 49
End: 2019-08-22
Payer: MEDICAID

## 2019-08-22 DIAGNOSIS — R29.898 WEAKNESS OF BOTH HIPS: ICD-10-CM

## 2019-08-22 DIAGNOSIS — M53.86 DECREASED RANGE OF MOTION OF LUMBAR SPINE: ICD-10-CM

## 2019-08-22 DIAGNOSIS — M62.9 HAMSTRING TIGHTNESS OF BOTH LOWER EXTREMITIES: ICD-10-CM

## 2019-08-22 PROCEDURE — 97110 THERAPEUTIC EXERCISES: CPT | Mod: PO

## 2019-08-22 NOTE — PROGRESS NOTES
"  Physical Therapy Daily Treatment Note     Name: Sonia ROSS Kurt  Clinic Number: [id number]    Therapy Diagnosis:   Encounter Diagnoses   Name Primary?    Decreased range of motion of lumbar spine     Hamstring tightness of both lower extremities     Weakness of both hips      Physician: Cynthia Gill NP    Visit Date: 8/22/2019    Physician Orders: PT Eval and Treat   Medical Diagnosis from Referral: Low back pain  Evaluation Date: 8/14/2019  Authorization Period Expiration: 08/31/2019  Plan of Care Expiration: 10/14/2019  Visit # / Visits authorized: 3/ 12    Time In: 8:00 am  Time Out: 8:38 am  Total Billable Time: 38 minutes    Precautions: Standard and Fall    Subjective     Pt reports: hasn't been to bed yet and has been trying to do it every day.  She was compliant with home exercise program.  Response to previous treatment: no soreness after last visit.  Functional change: none    Pain: 9/10  Location: left back  and buttocks      Objective     Sonia received therapeutic exercises to develop strength, flexibility and core stabilization for 38 minutes including:    Sonia presented to the clinic with a level pelvis.     Date 08/22/19 08/19/19   Visit 3/12 2/12   FTF 9/14/19 9/14/19   FOTO 3/5 2/5   POC exp 10/14/19 10/14/19        MHP -- --   MT -- --   Hamstring str 10 x 10" 10 x 10"   Piriformis str 10 x 10" 10 x 10"   Supine:     TAs 10 x 5" 10 x 5"   LTR 1 x 10 1 x 10   March 1 x 15 1 x 10   Bridge 1 x 15 1 x 10   SLR 1 x 15 1 x 10   SL hip add Next?    SL hip abd Next?    Prone:     Prone prop 5 x 5" Next?   Alt LE/UE -- --   Seated:     March 1 x 10 Next?   LAQs 1 x 10 --   Stand     Step ups --    Lunges --    Initials DG DG       Home Exercises Provided and Patient Education Provided     Education provided:   - keeping her exercises in a pain free range.     Written Home Exercises Provided: yes.  Exercises were reviewed and Sonia was able to demonstrate them prior to the end of the session.  Sonia " demonstrated fair  understanding of the education provided.     See EMR under Patient Instructions for exercises provided 8/19/2019.    Assessment     Sonia was able to perform sit to stand, supine to/from sit, and bed mobility with no difficulty and was able to ambulate inside the clinic with a normal gait pattern. She continues to require frequent cues to keep her exercises in a pain free range and only perform the prescribed number of reps of each exercise. She was able to perform all of today's progressions and new exercises with no increase in symptoms prior to leaving the clinic.    Sonia is progressing well towards her goals.   Pt prognosis is Fair.     Pt will continue to benefit from skilled outpatient physical therapy to address the deficits listed in the problem list box on initial evaluation, provide pt/family education and to maximize pt's level of independence in the home and community environment.     Pt's spiritual, cultural and educational needs considered and pt agreeable to plan of care and goals.     Anticipated barriers to physical therapy: none    Goals:   Short Term Goals: 4 weeks   1. This patient will be independent with a basic HEP. In progress  2. This patient will increase lumbar range of motion by 10 degrees in order to be able to assist her children with ADLs with no increase in symptoms. In progress  3. This patient will increase B LE strength by 1/2 grade in order to be able to perform vehicle transfers with no increase in symptoms. In progress  4. This patient will have a pain rating of 6/10 at worst with ADLs.In progress  5. Patient able to score greater than or equal to 34 on the FOTO Lumbar Spine Survey placing patient in 60%<80% impaired, limited, or restricted category demonstrating overall decreased low back pain with functional activities.In progress  Long Term Goals: 8 weeks   1. This patient will be independent with an updated HEP. In progress  2. This patient will increase B  LE strength to 5/5 in order to be able to perform her usual household duties with no increase in symptoms. In progress  3. This patient will have a pain rating of 3/10 at worst with ADLs.In progress  4. Patient able to score greater than or equal to 44 on the FOTO Lumbar Spine Survey placing patient in 40%<60% impaired, limited, or restricted category demonstrating overall decreased low back pain with functional activities.In progress    Plan][     Begin side lying hip abd and add next visit and increase reps with all other strengthening exercises next visit.     Poornima Cruz, PT

## 2019-08-22 NOTE — PATIENT INSTRUCTIONS
On Elbows (Prone)        Rise up on elbows as high as possible, keeping hips on floor. Hold 5 seconds.  Repeat 10 times per set. Do 1 sets per session. Do 2 sessions per day.     [email or web address]     Copyright © [employer]. All rights reserved.   Seated Leg Extension        Sit on chair, tighten abs. Straighten knee and return. Repeat with other leg.  Do 3 sets of 10 repetitions.    Copyright © [employer]. All rights reserved.   Seated Alternating Leg Raise (Marching)        Sit on chair with abs tight. Raise bent knee and return. Repeat with other leg.  Do 3 sets of 10 repetitions.    Copyright © [employer]. All rights reserved.

## 2019-08-26 ENCOUNTER — CLINICAL SUPPORT (OUTPATIENT)
Dept: REHABILITATION | Facility: HOSPITAL | Age: 49
End: 2019-08-26
Payer: MEDICAID

## 2019-08-26 DIAGNOSIS — R29.898 WEAKNESS OF BOTH HIPS: ICD-10-CM

## 2019-08-26 DIAGNOSIS — M62.9 HAMSTRING TIGHTNESS OF BOTH LOWER EXTREMITIES: ICD-10-CM

## 2019-08-26 DIAGNOSIS — M53.86 DECREASED RANGE OF MOTION OF LUMBAR SPINE: ICD-10-CM

## 2019-08-26 PROCEDURE — 97110 THERAPEUTIC EXERCISES: CPT | Mod: PO

## 2019-08-26 NOTE — PROGRESS NOTES
"  Physical Therapy Daily Treatment Note     Name: Sonia Hicks  Clinic Number: [id number]    Therapy Diagnosis:   Encounter Diagnoses   Name Primary?    Decreased range of motion of lumbar spine     Hamstring tightness of both lower extremities     Weakness of both hips      Physician: Cynthia Gill NP    Visit Date: 8/26/2019    Physician Orders: PT Eval and Treat   Medical Diagnosis from Referral: Low back pain  Evaluation Date: 8/14/2019  Authorization Period Expiration: 08/31/2019  Plan of Care Expiration: 10/14/2019  Visit # / Visits authorized: 4/ 12    Time In: 8:00 am  Time Out: 8:50 am  Total Billable Time: 25 minutes    Precautions: Standard and Fall    Subjective     Pt reports: still having a lot of back pain but this morning it is not as bad as last visit.   She was compliant with home exercise program.  Response to previous treatment: no soreness after last visit.  Functional change: none    Pain: 5/10  Location: left back  and buttocks      Objective     Sonia received therapeutic exercises to develop strength, flexibility and core stabilization for 25 minutes including:    Sonia presented to the clinic with a level pelvis.     Date 08/26/19 08/22/19 08/19/19   Visit 4/12 3/12 2/12   FTF 09/14/19 9/14/19 9/14/19   FOTO 4/5 3/5 2/5   POC exp 10/14/19 10/14/19 10/14/19         Supine:      Hamstring str 10 x 10" 10 x 10" 10 x 10"   Piriformis str Not today 10 x 10" 10 x 10"   TAs 10 x 5" 10 x 5" 10 x 5"   LTR 1 x 15 1 x 10 1 x 10   March 1 x 15 1 x 15 1 x 10   Bridge 2 x 10 1 x 15 1 x 10   SLR 2 x 10 1 x 15 1 x 10   SL hip add 1 x 10 Next?    SL hip abd 1 x 10 Next?    Prone:      Prone prop 10 x 5" 5 x 5" Next?   Alt LE/UE --- -- --   Seated:      March 1 x 15 1 x 10 Next?   LAQs 1 x 15 1 x 10 --   Stand      Step ups --- --    Lunges --- --          Initials GWA 1/6 DG DG       Home Exercises Provided and Patient Education Provided     Education provided:   - keeping her exercises in a pain free " range.     Written Home Exercises Provided: yes.  Exercises were reviewed and Sonia was able to demonstrate them prior to the end of the session.  Sonia demonstrated fair  understanding of the education provided.     See EMR under Patient Instructions for exercises provided 08/26/2019..    Assessment     Sonia was able to perform sit to stand, supine to/from sit, and bed mobility with no difficulty and was able to ambulate inside the clinic with a normal gait pattern.  She continues to require frequent cues to keep her exercises in a pain free range.  Patient performed above exercise program with new exercises added along with today's progressions with no increase in symptoms prior to leaving the clinic.      Sonia is progressing well towards her goals.   Pt prognosis is Fair.     Pt will continue to benefit from skilled outpatient physical therapy to address the deficits listed in the problem list box on initial evaluation, provide pt/family education and to maximize pt's level of independence in the home and community environment.     Pt's spiritual, cultural and educational needs considered and pt agreeable to plan of care and goals.     Anticipated barriers to physical therapy: none    Goals:   Short Term Goals: 4 weeks   1. This patient will be independent with a basic HEP. In progress  2. This patient will increase lumbar range of motion by 10 degrees in order to be able to assist her children with ADLs with no increase in symptoms. In progress  3. This patient will increase B LE strength by 1/2 grade in order to be able to perform vehicle transfers with no increase in symptoms. In progress  4. This patient will have a pain rating of 6/10 at worst with ADLs.In progress  5. Patient able to score greater than or equal to 34 on the FOTO Lumbar Spine Survey placing patient in 60%<80% impaired, limited, or restricted category demonstrating overall decreased low back pain with functional activities.In progress    Long  Term Goals: 8 weeks   1. This patient will be independent with an updated HEP. In progress  2. This patient will increase B LE strength to 5/5 in order to be able to perform her usual household duties with no increase in symptoms. In progress  3. This patient will have a pain rating of 3/10 at worst with ADLs.In progress  4. Patient able to score greater than or equal to 44 on the FOTO Lumbar Spine Survey placing patient in 40%<60% impaired, limited, or restricted category demonstrating overall decreased low back pain with functional activities.In progress    Plan][     Increase reps on side lying hip abd and add next visit.      Ozzy Reddy, PTA

## 2019-08-26 NOTE — PATIENT INSTRUCTIONS
Strengthening: Hip Abduction (Side-Lying)        Tighten muscles on side of left thigh, then lift leg from surface, keeping knee locked.   Repeat 10 times per set. Do 1 sets per session. Do 2 sessions per day.     [email or web address]       Strengthening: Hip Adduction (Side-Lying)        Tighten muscles on inside of right thigh, then lift leg from surface, keeping knee locked.   Repeat 10 times per set. Do 1 sets per session. Do 2 sessions per day.     [email or web address]     Copyright © [employer]. All rights reserved.

## 2019-09-09 ENCOUNTER — CLINICAL SUPPORT (OUTPATIENT)
Dept: REHABILITATION | Facility: HOSPITAL | Age: 49
End: 2019-09-09
Payer: MEDICAID

## 2019-09-09 DIAGNOSIS — R29.898 WEAKNESS OF BOTH HIPS: ICD-10-CM

## 2019-09-09 DIAGNOSIS — M62.9 HAMSTRING TIGHTNESS OF BOTH LOWER EXTREMITIES: ICD-10-CM

## 2019-09-09 DIAGNOSIS — M53.86 DECREASED RANGE OF MOTION OF LUMBAR SPINE: ICD-10-CM

## 2019-09-09 PROCEDURE — 97110 THERAPEUTIC EXERCISES: CPT | Mod: PO

## 2019-09-09 NOTE — PROGRESS NOTES
"  Physical Therapy Daily Treatment Note     Name: Sonia Hicks  Clinic Number: [id number]    Therapy Diagnosis:   Encounter Diagnoses   Name Primary?    Decreased range of motion of lumbar spine     Hamstring tightness of both lower extremities     Weakness of both hips      Physician: Cynthia Gill NP    Visit Date: 9/9/2019    Physician Orders: PT Eval and Treat   Medical Diagnosis from Referral: Low back pain  Evaluation Date: 8/14/2019  Authorization Period Expiration: 08/31/2019  Plan of Care Expiration: 10/14/2019  Visit # / Visits authorized: 5/ 12    Time In: 8:00 am  Time Out: 8:55 am  Total Billable Time: 30 minutes    Precautions: Standard and Fall    Subjective     Pt reports: still having a lot of back pain but this morning it is not as bad as last visit.   She was compliant with home exercise program.  Response to previous treatment: no soreness after last visit.  Functional change: none    Pain: 5/10  Location: left back  and buttocks      Objective     Sonia received therapeutic exercises to develop strength, flexibility and core stabilization for 30 minutes including:    Sonia presented to the clinic with a level pelvis.     Date 09/09/19 08/26/19 08/22/19 08/19/19   Visit 5/12 4/12 3/12 2/12   FTF 09/14/19 09/14/19 9/14/19 9/14/19   FOTO 5/5 4/5 3/5 2/5   POC exp 10/14/19 10/14/19 10/14/19 10/14/19          Supine:       Hamstring str 10 x 10" 10 x 10" 10 x 10" 10 x 10"   Piriformis str Not today Not today 10 x 10" 10 x 10"   TAs 10 x 5" 10 x 5" 10 x 5" 10 x 5"   LTR 1 x 15 1 x 15 1 x 10 1 x 10   March 1 x 15 1 x 15 1 x 15 1 x 10   Bridge 2 x 10 2 x 10 1 x 15 1 x 10   SLR 3 x 10 2 x 10 1 x 15 1 x 10   SL hip add 2 x 10 1 x 10 Next?    SL hip abd 2 x 10 1 x 10 Next?    Prone:       Prone prop 10 x 5" 10 x 5" 5 x 5" Next?   Alt LE/UE --- --- -- --   Seated:       March 2 x 10 1 x 15 1 x 10 Next?   LAQs 2 x 10 1 x 15 1 x 10 --   Stand       Step ups --- --- --    Lunges --- --- --           Initials " GWA 2/6 GWA 1/6 DG DG     Functional limitation reporting disability percentage was obtained through use of FOTO lumbar spine Survey indicating 56% disability     Home Exercises Provided and Patient Education Provided     Education provided:   - keeping her exercises in a pain free range.     Written Home Exercises Provided: yes.  Exercises were reviewed and Sonia was able to demonstrate them prior to the end of the session.  Sonia demonstrated fair  understanding of the education provided.     See EMR under Patient Instructions for exercises provided 08/26/2019..    Assessment     Sonia was able to perform sit to stand, supine to/from sit, and bed mobility with no difficulty and was able to ambulate inside the clinic with a normal gait pattern.   Patient performed above exercise program requiring frequent cues to keep her exercises in a pain free range and had no difficulty with today's progressions with no increase in symptoms prior to leaving the clinic.      Sonia is progressing well towards her goals.   Pt prognosis is Fair.     Pt will continue to benefit from skilled outpatient physical therapy to address the deficits listed in the problem list box on initial evaluation, provide pt/family education and to maximize pt's level of independence in the home and community environment.     Pt's spiritual, cultural and educational needs considered and pt agreeable to plan of care and goals.     Anticipated barriers to physical therapy: none    Goals:   Short Term Goals: 4 weeks   1. This patient will be independent with a basic HEP. In progress  2. This patient will increase lumbar range of motion by 10 degrees in order to be able to assist her children with ADLs with no increase in symptoms. In progress  3. This patient will increase B LE strength by 1/2 grade in order to be able to perform vehicle transfers with no increase in symptoms. In progress  4. This patient will have a pain rating of 6/10 at worst with ADLs.In  progress  5. Patient able to score greater than or equal to 34 on the FOTO Lumbar Spine Survey placing patient in 60%<80% impaired, limited, or restricted category demonstrating overall decreased low back pain with functional activities.In progress    Long Term Goals: 8 weeks   1. This patient will be independent with an updated HEP. In progress  2. This patient will increase B LE strength to 5/5 in order to be able to perform her usual household duties with no increase in symptoms. In progress  3. This patient will have a pain rating of 3/10 at worst with ADLs.In progress  4. Patient able to score greater than or equal to 44 on the FOTO Lumbar Spine Survey placing patient in 40%<60% impaired, limited, or restricted category demonstrating overall decreased low back pain with functional activities.In progress    Plan][     Increase reps on seated exercises next visit.      Ozzy Reddy, PTA

## 2019-09-12 ENCOUNTER — DOCUMENTATION ONLY (OUTPATIENT)
Dept: REHABILITATION | Facility: HOSPITAL | Age: 49
End: 2019-09-12

## 2019-09-12 ENCOUNTER — CLINICAL SUPPORT (OUTPATIENT)
Dept: REHABILITATION | Facility: HOSPITAL | Age: 49
End: 2019-09-12
Payer: MEDICAID

## 2019-09-12 DIAGNOSIS — M62.9 HAMSTRING TIGHTNESS OF BOTH LOWER EXTREMITIES: ICD-10-CM

## 2019-09-12 DIAGNOSIS — R29.898 WEAKNESS OF BOTH HIPS: ICD-10-CM

## 2019-09-12 DIAGNOSIS — M53.86 DECREASED RANGE OF MOTION OF LUMBAR SPINE: ICD-10-CM

## 2019-09-12 PROCEDURE — 97110 THERAPEUTIC EXERCISES: CPT | Mod: PO

## 2019-09-12 NOTE — PROGRESS NOTES
"  Physical Therapy Daily Treatment Note     Name: Sonia Hicks  Clinic Number: [id number]    Therapy Diagnosis:   Encounter Diagnoses   Name Primary?    Decreased range of motion of lumbar spine     Hamstring tightness of both lower extremities     Weakness of both hips      Physician: Cynthia Gill NP    Visit Date: 9/12/2019    Physician Orders: PT Eval and Treat   Medical Diagnosis from Referral: Low back pain  Evaluation Date: 8/14/2019  Authorization Period Expiration: 08/31/2019  Plan of Care Expiration: 10/14/2019  Visit # / Visits authorized: 6/ 12    Time In: 8:00 am  Time Out: 8:50 am  Total Billable Time: 45 minutes    Precautions: Standard and Fall    Subjective     Pt reports: always having pain in her L hip, catching in her neck today and light little muscle trying to lock up  She was compliant with home exercise program.  Response to previous treatment: no soreness after last visit.  Functional change: none    Pain: 6/10  Location: left back  and buttocks      Objective     Sonia received therapeutic exercises to develop strength, flexibility and core stabilization for 45 minutes including:    Sonia presented to the clinic with a level pelvis.     Date 09/12/19 09/09/19 08/26/19 08/22/19 08/19/19   Visit 6/12 5/12 4/12 3/12 2/12   FTF 10/12/19 09/14/19 09/14/19 9/14/19 9/14/19   FOTO -- 5/5 4/5 3/5 2/5   POC exp 10/14/19 10/14/19 10/14/19 10/14/19 10/14/19           Supine:        Hamstring str 10 x 10" 10 x 10" 10 x 10" 10 x 10" 10 x 10"   Piriformis str 5 x 10" Not today Not today 10 x 10" 10 x 10"   TAs 10 x 5" 10 x 5" 10 x 5" 10 x 5" 10 x 5"   LTR 1 x 15 1 x 15 1 x 15 1 x 10 1 x 10   March 2 x 10 1 x 15 1 x 15 1 x 15 1 x 10   Bridge 2 x 10 2 x 10 2 x 10 1 x 15 1 x 10   SLR 3 x 10 3 x 10 2 x 10 1 x 15 1 x 10   SL hip add 2 x 10 2 x 10 1 x 10 Next?    SL hip abd 2 x 10 2 x 10 1 x 10 Next?    Prone:        Prone prop 10 x 5" 10 x 5" 10 x 5" 5 x 5" Next?   Hip ext 1 x 10 --- --- -- --   Seated:      "   March 2 x 10 2 x 10 1 x 15 1 x 10 Next?   LAQs 2 x 10 2 x 10 1 x 15 1 x 10 --   Stand        Step ups -- --- --- --    Lunges -- --- --- --            Initials DG GWA 2/6 GWA 1/6 DG DG       Home Exercises Provided and Patient Education Provided     Education provided:   - keeping her exercises in a pain free range.     Written Home Exercises Provided: yes.  Exercises were reviewed and Sonia was able to demonstrate them prior to the end of the session.  Sonia demonstrated fair  understanding of the education provided.     See EMR under Patient Instructions for exercises provided 08/26/2019..    Assessment     Sonia ambulated inside the clinic with a normal gait pattern and performed bed mobility with no difficulty or complaints of pain. She reports doing her HEP on a regular basis but continues to require frequent verbal and tactile cues to perform her exercises correctly. She performed all of today's progressions and new exercises with no increase in symptoms prior to leaving the clinic  Sonia is progressing well towards her goals.   Pt prognosis is Fair.     Pt will continue to benefit from skilled outpatient physical therapy to address the deficits listed in the problem list box on initial evaluation, provide pt/family education and to maximize pt's level of independence in the home and community environment.     Pt's spiritual, cultural and educational needs considered and pt agreeable to plan of care and goals.     Anticipated barriers to physical therapy: none    Goals:   Short Term Goals: 4 weeks   1. This patient will be independent with a basic HEP. In progress  2. This patient will increase lumbar range of motion by 10 degrees in order to be able to assist her children with ADLs with no increase in symptoms. In progress  3. This patient will increase B LE strength by 1/2 grade in order to be able to perform vehicle transfers with no increase in symptoms. In progress  4. This patient will have a pain rating  of 6/10 at worst with ADLs.In progress  5. Patient able to score greater than or equal to 34 on the FOTO Lumbar Spine Survey placing patient in 60%<80% impaired, limited, or restricted category demonstrating overall decreased low back pain with functional activities.In progress    Long Term Goals: 8 weeks   1. This patient will be independent with an updated HEP. In progress  2. This patient will increase B LE strength to 5/5 in order to be able to perform her usual household duties with no increase in symptoms. In progress  3. This patient will have a pain rating of 3/10 at worst with ADLs.In progress  4. Patient able to score greater than or equal to 44 on the FOTO Lumbar Spine Survey placing patient in 40%<60% impaired, limited, or restricted category demonstrating overall decreased low back pain with functional activities.In progress    Plan][     Increase reps on marching, bridging, side lying exercises and prone hip extension next visit    Poornima Cruz, PT

## 2019-09-12 NOTE — PROGRESS NOTES
Face to Face PTA Conference performed with Ozzy Reddy, PTA regarding patient's current status, overall progress, and plan of care    Face to Face PTA Conference performed with Poornima Cruz, PT regarding patient's current status, overall progress, and plan of care    Ozzy Reddy  9/12/2019

## 2019-09-16 ENCOUNTER — CLINICAL SUPPORT (OUTPATIENT)
Dept: REHABILITATION | Facility: HOSPITAL | Age: 49
End: 2019-09-16
Payer: MEDICAID

## 2019-09-16 DIAGNOSIS — M62.9 HAMSTRING TIGHTNESS OF BOTH LOWER EXTREMITIES: ICD-10-CM

## 2019-09-16 DIAGNOSIS — M53.86 DECREASED RANGE OF MOTION OF LUMBAR SPINE: ICD-10-CM

## 2019-09-16 DIAGNOSIS — R29.898 WEAKNESS OF BOTH HIPS: ICD-10-CM

## 2019-09-16 PROCEDURE — 97110 THERAPEUTIC EXERCISES: CPT | Mod: PO

## 2019-09-16 NOTE — PROGRESS NOTES
"  Physical Therapy Daily Treatment Note     Name: Sonia Hicks  Clinic Number: [id number]    Therapy Diagnosis:   Encounter Diagnoses   Name Primary?    Decreased range of motion of lumbar spine     Hamstring tightness of both lower extremities     Weakness of both hips      Physician: Cynthia Gill NP    Visit Date: 9/16/2019    Physician Orders: PT Eval and Treat   Medical Diagnosis from Referral: Low back pain  Evaluation Date: 8/14/2019  Authorization Period Expiration: 08/31/2019  Plan of Care Expiration: 10/14/2019  Visit # / Visits authorized: 6/ 12    Time In: 8:00 am  Time Out: 8:55 am  Total Billable Time: 25 minutes    Precautions: Standard and Fall    Subjective     Pt reports: her pain is always there, it never goes away.  She was compliant with home exercise program.  Response to previous treatment: no soreness after last visit.  Functional change: none    Pain: 6/10  Location: left back  and buttocks      Objective     Sonia received therapeutic exercises to develop strength, flexibility and core stabilization for 25 minutes including:    Sonia presented to the clinic with a level pelvis.     Date 09/16/19 09/12/19 09/09/19 08/26/19 08/22/19 08/19/19   Visit 7/12 6/12 5/12 4/12 3/12 2/12   FTF 10/12/19 10/12/19 09/14/19 09/14/19 9/14/19 9/14/19   FOTO --- -- 5/5 4/5 3/5 2/5   POC exp 10/14/119 10/14/19 10/14/19 10/14/19 10/14/19 10/14/19            Supine:         Hamstring str 10 x 10" 10 x 10" 10 x 10" 10 x 10" 10 x 10" 10 x 10"   Piriformis str 10 x 10" 5 x 10" Not today Not today 10 x 10" 10 x 10"   TAs 10 x 5" 10 x 5" 10 x 5" 10 x 5" 10 x 5" 10 x 5"   LTR 1 x 15 1 x 15 1 x 15 1 x 15 1 x 10 1 x 10   March 2 x 10 2 x 10 1 x 15 1 x 15 1 x 15 1 x 10   Bridge 1 x 25 2 x 10 2 x 10 2 x 10 1 x 15 1 x 10   SLR 3 x 10 3 x 10 3 x 10 2 x 10 1 x 15 1 x 10   SL hip add 3 x 10 2 x 10 2 x 10 1 x 10 Next?    SL hip abd 3 x 10 2 x 10 2 x 10 1 x 10 Next?    Prone:         Prone prop 10 x 5" 10 x 5" 10 x 5" 10 x 5" " "5 x 5" Next?   Hip ext 2 x 10 1 x 10 --- --- -- --   Seated:         March 2 x 10 2 x 10 2 x 10 1 x 15 1 x 10 Next?   LAQs 2 x 10 2 x 10 2 x 10 1 x 15 1 x 10 --   Stand         Step ups --- -- --- --- --    Lunges --- -- --- --- --             Initials GWA 1/6 DG GWA 2/6 GWA 1/6 DG DG       Home Exercises Provided and Patient Education Provided     Education provided:   - keeping her exercises in a pain free range.     Written Home Exercises Provided: yes.  Exercises were reviewed and Sonia was able to demonstrate them prior to the end of the session.  Sonia demonstrated fair  understanding of the education provided.     See EMR under Patient Instructions for exercises provided 08/26/2019..    Assessment     Sonia ambulated inside the clinic with a normal gait pattern and performed bed mobility with no difficulty or complaints of pain.  She reports doing her HEP on a regular basis but continues to require frequent verbal and tactile cues to perform her exercises correctly and to go from one exercise to the next.  Patient performed above exercise program and had no difficulty with today's progressions with no increase in symptoms prior to leaving the clinic  Sonia is progressing well towards her goals.   Pt prognosis is Fair.     Pt will continue to benefit from skilled outpatient physical therapy to address the deficits listed in the problem list box on initial evaluation, provide pt/family education and to maximize pt's level of independence in the home and community environment.     Pt's spiritual, cultural and educational needs considered and pt agreeable to plan of care and goals.     Anticipated barriers to physical therapy: none    Goals:   Short Term Goals: 4 weeks   1. This patient will be independent with a basic HEP. In progress  2. This patient will increase lumbar range of motion by 10 degrees in order to be able to assist her children with ADLs with no increase in symptoms. In progress  3. This patient will " increase B LE strength by 1/2 grade in order to be able to perform vehicle transfers with no increase in symptoms. In progress  4. This patient will have a pain rating of 6/10 at worst with ADLs.In progress  5. Patient able to score greater than or equal to 34 on the FOTO Lumbar Spine Survey placing patient in 60%<80% impaired, limited, or restricted category demonstrating overall decreased low back pain with functional activities.In progress    Long Term Goals: 8 weeks   1. This patient will be independent with an updated HEP. In progress  2. This patient will increase B LE strength to 5/5 in order to be able to perform her usual household duties with no increase in symptoms. In progress  3. This patient will have a pain rating of 3/10 at worst with ADLs.In progress  4. Patient able to score greater than or equal to 44 on the FOTO Lumbar Spine Survey placing patient in 40%<60% impaired, limited, or restricted category demonstrating overall decreased low back pain with functional activities.In progress    Plan][     Increase reps on seated exercises next visit    Ozzy Reddy, PTA

## 2019-09-23 ENCOUNTER — TELEPHONE (OUTPATIENT)
Dept: REHABILITATION | Facility: HOSPITAL | Age: 49
End: 2019-09-23

## 2019-09-23 NOTE — TELEPHONE ENCOUNTER
Called regarding today's No Show, spoke with patient and she stated that she had car trouble and did not get out of the shop until this afternoon.  Patient apologized for not calling.  Reminded her that her next appointment is on 9/26 @ 8:00.  Patient verbally acknowledged.

## 2019-09-26 ENCOUNTER — CLINICAL SUPPORT (OUTPATIENT)
Dept: REHABILITATION | Facility: HOSPITAL | Age: 49
End: 2019-09-26
Payer: MEDICAID

## 2019-09-26 DIAGNOSIS — M62.9 HAMSTRING TIGHTNESS OF BOTH LOWER EXTREMITIES: ICD-10-CM

## 2019-09-26 DIAGNOSIS — M53.86 DECREASED RANGE OF MOTION OF LUMBAR SPINE: ICD-10-CM

## 2019-09-26 DIAGNOSIS — R29.898 WEAKNESS OF BOTH HIPS: ICD-10-CM

## 2019-09-26 PROCEDURE — 97110 THERAPEUTIC EXERCISES: CPT | Mod: PO

## 2019-09-26 NOTE — PROGRESS NOTES
"  Physical Therapy Daily Treatment Note     Name: Sonia ROSS Kurt  Clinic Number: [id number]    Therapy Diagnosis:   Encounter Diagnoses   Name Primary?    Decreased range of motion of lumbar spine     Hamstring tightness of both lower extremities     Weakness of both hips      Physician: Cynthia Gill NP    Visit Date: 9/26/2019    Physician Orders: PT Eval and Treat   Medical Diagnosis from Referral: Low back pain  Evaluation Date: 8/14/2019  Authorization Period Expiration: 08/31/2019  Plan of Care Expiration: 10/14/2019  Visit # / Visits authorized: 8/ 12    Time In: 8:14 am  Time Out: 9:00 am  Total Billable Time: 46 minutes    Precautions: Standard and Fall    Subjective     Pt reports: having pain every day.  She was compliant with home exercise program.  Response to previous treatment: no soreness afterwards, just be in pain  Functional change: none    Pain: 6/10  Location: left back  and buttocks      Objective     Sonia received therapeutic exercises to develop strength, flexibility and core stabilization for 46 minutes including:    Sonia presented to the clinic with a level pelvis.     Date 09/26/19 09/16/19 09/12/19 09/09/19 08/26/19 08/22/19 08/19/19   Visit 8/12 7/12 6/12 5/12 4/12 3/12 2/12   FTF 10/12/19 10/12/19 10/12/19 09/14/19 09/14/19 9/14/19 9/14/19   FOTO -- --- -- 5/5 4/5 3/5 2/5   POC exp 10/14/19 10/14/119 10/14/19 10/14/19 10/14/19 10/14/19 10/14/19             Supine:          Hamstring str 10 x 10" 10 x 10" 10 x 10" 10 x 10" 10 x 10" 10 x 10" 10 x 10"   Piriformis str 10 x 10" 10 x 10" 5 x 10" Not today Not today 10 x 10" 10 x 10"   TAs 10 x 5" 10 x 5" 10 x 5" 10 x 5" 10 x 5" 10 x 5" 10 x 5"   LTR 1 x 15 1 x 15 1 x 15 1 x 15 1 x 15 1 x 10 1 x 10   March Not today 2 x 10 2 x 10 1 x 15 1 x 15 1 x 15 1 x 10   Bridge 1 x 25 1 x 25 2 x 10 2 x 10 2 x 10 1 x 15 1 x 10   SLR 2 x 10 x 1# 3 x 10 3 x 10 3 x 10 2 x 10 1 x 15 1 x 10   SL hip add 2 x 10 x 1# 3 x 10 2 x 10 2 x 10 1 x 10 Next?    SL hip " "abd 2 x 10 x 1# 3 x 10 2 x 10 2 x 10 1 x 10 Next?    Prone:          Prone prop 10 x 5" 10 x 5" 10 x 5" 10 x 5" 10 x 5" 5 x 5" Next?   Hip ext 2 x 10 2 x 10 1 x 10 --- --- -- --   Seated:          March 3 x 10 2 x 10 2 x 10 2 x 10 1 x 15 1 x 10 Next?   LAQs Not today 2 x 10 2 x 10 2 x 10 1 x 15 1 x 10 --   Stand          Step ups -- --- -- --- --- --    Lunges -- --- -- --- --- --              Initials DG GWA 1/6 DG GWA 2/6 GWA 1/6 DG DG     Lumbar Range of Motion:     Degrees Pain   Flexion 50 Pulling in buttocks   Extension 10 Pain t-l junction   Left Side Bending 10 Pulling L side   Right Side Bending 15 Pain R side       Home Exercises Provided and Patient Education Provided     Education provided:   - keeping her exercises in a pain free range.     Written Home Exercises Provided: yes.  Exercises were reviewed and Sonia was able to demonstrate them prior to the end of the session.  Sonia demonstrated fair  understanding of the education provided.     See EMR under Patient Instructions for exercises provided 08/26/2019..    Assessment     Sonia arrived to her appointment 14 minutes late, due to time constraints she did not complete all of her usual exercise. Despite reporting she is doing her HEP on a regular basis she continues to require verbal and tactile cues to perform them correctly. She performed all of today's activities with no increase in symptoms prior to leaving the clinic.   Sonia is progressing well towards her goals.   Pt prognosis is Fair.     Pt will continue to benefit from skilled outpatient physical therapy to address the deficits listed in the problem list box on initial evaluation, provide pt/family education and to maximize pt's level of independence in the home and community environment.     Pt's spiritual, cultural and educational needs considered and pt agreeable to plan of care and goals.     Anticipated barriers to physical therapy: none    Goals:   Short Term Goals: 4 weeks   1. This " patient will be independent with a basic HEP. In progress  2. This patient will increase lumbar range of motion by 10 degrees in order to be able to assist her children with ADLs with no increase in symptoms. In progress  3. This patient will increase B LE strength by 1/2 grade in order to be able to perform vehicle transfers with no increase in symptoms. In progress  4. This patient will have a pain rating of 6/10 at worst with ADLs.In progress  5. Patient able to score greater than or equal to 34 on the FOTO Lumbar Spine Survey placing patient in 60%<80% impaired, limited, or restricted category demonstrating overall decreased low back pain with functional activities.In progress    Long Term Goals: 8 weeks   1. This patient will be independent with an updated HEP. In progress  2. This patient will increase B LE strength to 5/5 in order to be able to perform her usual household duties with no increase in symptoms. In progress  3. This patient will have a pain rating of 3/10 at worst with ADLs.In progress  4. Patient able to score greater than or equal to 44 on the FOTO Lumbar Spine Survey placing patient in 40%<60% impaired, limited, or restricted category demonstrating overall decreased low back pain with functional activities.In progress    Plan][     Resume all of her usual exercises next visit.     Poornima Cruz, PT

## 2019-09-30 ENCOUNTER — TELEPHONE (OUTPATIENT)
Dept: REHABILITATION | Facility: HOSPITAL | Age: 49
End: 2019-09-30

## 2019-09-30 NOTE — TELEPHONE ENCOUNTER
Called regarding today's No Show, spoke with patient and she stated she took her child to a doctor's appointment today.  Reminded her that her next appointment is on 10/3 @ 8:00.  Patient verbally acknowledged.

## 2019-10-03 ENCOUNTER — TELEPHONE (OUTPATIENT)
Dept: REHABILITATION | Facility: HOSPITAL | Age: 49
End: 2019-10-03

## 2019-10-03 NOTE — TELEPHONE ENCOUNTER
Called regarding today's No Show, spoke with patient, she stated that she called several times today but kept getting the voicemail and she didn't leave a message because she wanted to talk to someone to say that she didn't have transportation but that she is renting a car and will definitely make her appointment on Monday.    Today made her 6th No Show:  8/29, 9/5, 9/119, 9/23, 9/30, 10/3

## 2019-10-07 ENCOUNTER — CLINICAL SUPPORT (OUTPATIENT)
Dept: REHABILITATION | Facility: HOSPITAL | Age: 49
End: 2019-10-07
Payer: MEDICAID

## 2019-10-07 DIAGNOSIS — M62.9 HAMSTRING TIGHTNESS OF BOTH LOWER EXTREMITIES: ICD-10-CM

## 2019-10-07 DIAGNOSIS — R29.898 WEAKNESS OF BOTH HIPS: ICD-10-CM

## 2019-10-07 DIAGNOSIS — M53.86 DECREASED RANGE OF MOTION OF LUMBAR SPINE: ICD-10-CM

## 2019-10-07 PROCEDURE — 97110 THERAPEUTIC EXERCISES: CPT | Mod: PO

## 2019-10-07 NOTE — PROGRESS NOTES
"  Physical Therapy Daily Treatment Note     Name: Sonia Hicks  Clinic Number: [id number]    Therapy Diagnosis:   Encounter Diagnoses   Name Primary?    Decreased range of motion of lumbar spine     Hamstring tightness of both lower extremities     Weakness of both hips      Physician: Cynthia Gill NP    Visit Date: 10/7/2019    Physician Orders: PT Eval and Treat   Medical Diagnosis from Referral: Low back pain  Evaluation Date: 8/14/2019  Authorization Period Expiration: 08/31/2019  Plan of Care Expiration: 10/14/2019  Visit # / Visits authorized: 9/ 12    Time In: 8:00 am  Time Out: 8:55 am  Total Billable Time: 25 minutes    Precautions: Standard and Fall    Subjective     Pt reports: haven't been working so her back has eased up. She is also starting therapy up again for her back as they are still trying to decide what to do with her back.   She was compliant with home exercise program.  Response to previous treatment: no soreness afterwards  Functional change: none    Pain: 5/10  Location: left back  and buttocks      Objective     Sonia received therapeutic exercises to develop strength, flexibility and core stabilization for 25 minutes including:    Sonia presented to the clinic with a level pelvis.     Date 10/07/19 09/26/19 09/16/19 09/12/19 09/09/19 08/26/19 08/22/19 08/19/19   Visit 9/12 8/12 7/12 6/12 5/12 4/12 3/12 2/12   FTF 10/12/19 10/12/19 10/12/19 10/12/19 09/14/19 09/14/19 9/14/19 9/14/19   FOTO -- -- --- -- 5/5 4/5 3/5 2/5   POC exp 10/14/19 10/14/19 10/14/119 10/14/19 10/14/19 10/14/19 10/14/19 10/14/19              Supine:           Hamstring str 10 x 10" 10 x 10" 10 x 10" 10 x 10" 10 x 10" 10 x 10" 10 x 10" 10 x 10"   Piriformis str 10 x 10" 10 x 10" 10 x 10" 5 x 10" Not today Not today 10 x 10" 10 x 10"   TAs 10 x 5" 10 x 5" 10 x 5" 10 x 5" 10 x 5" 10 x 5" 10 x 5" 10 x 5"   LTR 1 x 15 1 x 15 1 x 15 1 x 15 1 x 15 1 x 15 1 x 10 1 x 10   March 2 x 10 Not today 2 x 10 2 x 10 1 x 15 1 x 15 1 x " "15 1 x 10   Bridge 1 x  25 1 x 25 1 x 25 2 x 10 2 x 10 2 x 10 1 x 15 1 x 10   SLR 2 x 10 x 1# 2 x 10 x 1# 3 x 10 3 x 10 3 x 10 2 x 10 1 x 15 1 x 10   SL hip add 2 x 10 x 1# 2 x 10 x 1# 3 x 10 2 x 10 2 x 10 1 x 10 Next?    SL hip abd 2 x 10 x 1# 2 x 10 x 1# 3 x 10 2 x 10 2 x 10 1 x 10 Next?    Prone:           Prone prop 10 x 5" 10 x 5" 10 x 5" 10 x 5" 10 x 5" 10 x 5" 5 x 5" Next?   Hip ext 2 x 10 2 x 10 2 x 10 1 x 10 --- --- -- --   Seated:           March 3 x 10 3 x 10 2 x 10 2 x 10 2 x 10 1 x 15 1 x 10 Next?   LAQs 2 x 10 Not today 2 x 10 2 x 10 2 x 10 1 x 15 1 x 10 --   Stand           Step ups -- -- --- -- --- --- --    Lunges -- -- --- -- --- --- --               Initials DG DG GWA 1/6 DG GWA 2/6 GWA 1/6 DG DG       Home Exercises Provided and Patient Education Provided     Education provided:   - keeping her exercises in a pain free range.     Written Home Exercises Provided: yes.  Exercises were reviewed and Sonia was able to demonstrate them prior to the end of the session.  Sonia demonstrated fair  understanding of the education provided.     See EMR under Patient Instructions for exercises provided 08/26/2019..    Assessment     Sonia was able to perform all of today's activities with no increase in symptoms prior to leaving the clinic. She ambulated into and out of the gym with a normal gait pattern, along with getting on/off the exercise plinth with no difficulty. When aware of being observed she made very small movements with SLR, hip abd and hip add but when not aware of being observed by PT she moved through her full range of motion.   Sonia is progressing well towards her goals.   Pt prognosis is Fair.     Pt will continue to benefit from skilled outpatient physical therapy to address the deficits listed in the problem list box on initial evaluation, provide pt/family education and to maximize pt's level of independence in the home and community environment.     Pt's spiritual, cultural and educational " needs considered and pt agreeable to plan of care and goals.     Anticipated barriers to physical therapy: none    Goals:   Short Term Goals: 4 weeks   1. This patient will be independent with a basic HEP. In progress  2. This patient will increase lumbar range of motion by 10 degrees in order to be able to assist her children with ADLs with no increase in symptoms. In progress  3. This patient will increase B LE strength by 1/2 grade in order to be able to perform vehicle transfers with no increase in symptoms. In progress  4. This patient will have a pain rating of 6/10 at worst with ADLs.In progress  5. Patient able to score greater than or equal to 34 on the FOTO Lumbar Spine Survey placing patient in 60%<80% impaired, limited, or restricted category demonstrating overall decreased low back pain with functional activities.In progress    Long Term Goals: 8 weeks   1. This patient will be independent with an updated HEP. In progress  2. This patient will increase B LE strength to 5/5 in order to be able to perform her usual household duties with no increase in symptoms. In progress  3. This patient will have a pain rating of 3/10 at worst with ADLs.In progress  4. Patient able to score greater than or equal to 44 on the FOTO Lumbar Spine Survey placing patient in 40%<60% impaired, limited, or restricted category demonstrating overall decreased low back pain with functional activities.In progress    Plan][     Increase reps with SLR, side lying hip abduction, side lying hip adduction, and prone hip extension next visit.     Poornima Cruz, PT

## 2019-10-10 ENCOUNTER — CLINICAL SUPPORT (OUTPATIENT)
Dept: REHABILITATION | Facility: HOSPITAL | Age: 49
End: 2019-10-10
Payer: MEDICAID

## 2019-10-10 DIAGNOSIS — M62.9 HAMSTRING TIGHTNESS OF BOTH LOWER EXTREMITIES: ICD-10-CM

## 2019-10-10 DIAGNOSIS — R29.898 WEAKNESS OF BOTH HIPS: ICD-10-CM

## 2019-10-10 DIAGNOSIS — M53.86 DECREASED RANGE OF MOTION OF LUMBAR SPINE: ICD-10-CM

## 2019-10-10 PROCEDURE — 97110 THERAPEUTIC EXERCISES: CPT | Mod: PO

## 2019-10-10 NOTE — PROGRESS NOTES
"  Physical Therapy Daily Treatment Note     Name: Sonia Hicks  Clinic Number: [id number]    Therapy Diagnosis:   Encounter Diagnoses   Name Primary?    Decreased range of motion of lumbar spine     Hamstring tightness of both lower extremities     Weakness of both hips      Physician: Cynthia Gill NP    Visit Date: 10/10/2019    Physician Orders: PT Eval and Treat   Medical Diagnosis from Referral: Low back pain  Evaluation Date: 8/14/2019  Authorization Period Expiration: 08/31/2019  Plan of Care Expiration: 10/14/2019  Visit # / Visits authorized: 10/ 12    Time In: 8:00 am  Time Out: 9:00 am  Total Billable Time: 60 minutes    Precautions: Standard and Fall    Subjective     Pt reports: still pain in her L buttock, sometimes has trouble with the stretch where she crosses her legs. She is going to her doctor tomorrow.   She was compliant with home exercise program.  Response to previous treatment: no soreness afterwards  Functional change: none    Pain: 5/10  Location: left back  and buttocks      Objective     Sonia received therapeutic exercises to develop strength, flexibility and core stabilization for 60 minutes including:    Sonia presented to the clinic with a level pelvis.     Date 10/10/19 10/07/19 09/26/19 09/16/19 09/12/19 09/09/19 08/26/19 08/22/19 08/19/19   Visit 10/12 9/12 8/12 7/12 6/12 5/12 4/12 3/12 2/12   FTF 10/12/19 10/12/19 10/12/19 10/12/19 10/12/19 09/14/19 09/14/19 9/14/19 9/14/19   FOTO -- -- -- --- -- 5/5 4/5 3/5 2/5   POC exp 10/14/19 10/14/19 10/14/19 10/14/119 10/14/19 10/14/19 10/14/19 10/14/19 10/14/19               Supine:            Hamstring str 10 x 10" 10 x 10" 10 x 10" 10 x 10" 10 x 10" 10 x 10" 10 x 10" 10 x 10" 10 x 10"   Piriformis str 10 x 10" 10 x 10" 10 x 10" 10 x 10" 5 x 10" Not today Not today 10 x 10" 10 x 10"   TAs 10 x 5" 10 x 5" 10 x 5" 10 x 5" 10 x 5" 10 x 5" 10 x 5" 10 x 5" 10 x 5"   LTR 1 x 15 1 x 15 1 x 15 1 x 15 1 x 15 1 x 15 1 x 15 1 x 10 1 x 10   March 2 x " "10 2 x 10 Not today 2 x 10 2 x 10 1 x 15 1 x 15 1 x 15 1 x 10   Bridge 1 x 25 1 x  25 1 x 25 1 x 25 2 x 10 2 x 10 2 x 10 1 x 15 1 x 10   SLR 2 x 10 x 1# 2 x 10 x 1# 2 x 10 x 1# 3 x 10 3 x 10 3 x 10 2 x 10 1 x 15 1 x 10   SL hip add 2 x 10 x 1# 2 x 10 x 1# 2 x 10 x 1# 3 x 10 2 x 10 2 x 10 1 x 10 Next?    SL hip abd 2 x 10 x 1# 2 x 10 x 1# 2 x 10 x 1# 3 x 10 2 x 10 2 x 10 1 x 10 Next?    Prone:            Prone prop 10 x 5" 10 x 5" 10 x 5" 10 x 5" 10 x 5" 10 x 5" 10 x 5" 5 x 5" Next?   Hip ext 2 x 10 2 x 10 2 x 10 2 x 10 1 x 10 --- --- -- --   Seated:            March 2 x 10 x 1# 3 x 10 3 x 10 2 x 10 2 x 10 2 x 10 1 x 15 1 x 10 Next?   LAQs 2 x 10 x 1# 2 x 10 Not today 2 x 10 2 x 10 2 x 10 1 x 15 1 x 10 --   Stand            Step ups -- -- -- --- -- --- --- --    Lunges -- -- -- --- -- --- --- --                Initials DG DG DG GWA 1/6 DG GWA 2/6 GWA 1/6 [facility]       Home Exercises Provided and Patient Education Provided     Education provided:   -performing piriformis stretch seated    Written Home Exercises Provided: yes.  Exercises were reviewed and Sonia was able to demonstrate them prior to the end of the session.  Sonia demonstrated fair  understanding of the education provided.     See EMR under Patient Instructions for exercises provided 08/26/2019..    Assessment     Sonia was able to complete all of today's activities with no increase in symptoms prior to leaving the clinic. She continues to ambulate with a normal gait pattern when in the clinic. She was able to perform supine to/frlom long sitting twice during the session with no difficulty or complaints of pain. When placing a rolled up towel under her lumbar spine region while performing supine exercise she was able to bridge several inches off the surface of the table with no difficulty but when performing bridging exercise she only lifted her hips 1-2 inches off the table. She continues to be able to lift her legs off the table several inches with SLR when " not aware of being observed by PT but when asked to perform SLR exercise she is only able to lift her legs 1 inch off the table.   Sonia is progressing well towards her goals.   Pt prognosis is Fair.     Pt will continue to benefit from skilled outpatient physical therapy to address the deficits listed in the problem list box on initial evaluation, provide pt/family education and to maximize pt's level of independence in the home and community environment.     Pt's spiritual, cultural and educational needs considered and pt agreeable to plan of care and goals.     Anticipated barriers to physical therapy: none    Goals:   Short Term Goals: 4 weeks   1. This patient will be independent with a basic HEP. In progress  2. This patient will increase lumbar range of motion by 10 degrees in order to be able to assist her children with ADLs with no increase in symptoms. In progress  3. This patient will increase B LE strength by 1/2 grade in order to be able to perform vehicle transfers with no increase in symptoms. In progress  4. This patient will have a pain rating of 6/10 at worst with ADLs.In progress  5. Patient able to score greater than or equal to 34 on the FOTO Lumbar Spine Survey placing patient in 60%<80% impaired, limited, or restricted category demonstrating overall decreased low back pain with functional activities.In progress    Long Term Goals: 8 weeks   1. This patient will be independent with an updated HEP. In progress  2. This patient will increase B LE strength to 5/5 in order to be able to perform her usual household duties with no increase in symptoms. In progress  3. This patient will have a pain rating of 3/10 at worst with ADLs.In progress  4. Patient able to score greater than or equal to 44 on the FOTO Lumbar Spine Survey placing patient in 40%<60% impaired, limited, or restricted category demonstrating overall decreased low back pain with functional activities.In progress    Plan][     Reassess  patient next visit for discharge to HEP.    Poornima Cruz, PT

## 2019-10-10 NOTE — PATIENT INSTRUCTIONS
Piriformis Stretch, Sitting        Sit, one ankle on opposite knee, same-side hand on crossed knee. Push down on knee, keeping spine straight. Lean torso forward, with flat back, until tension is felt in hamstrings and gluteals of crossed-leg side. Hold 10 seconds.   Repeat 10 times per session. Do 2 sessions per day.    Copyright © [employer]. All rights reserved.

## 2019-10-14 ENCOUNTER — DOCUMENTATION ONLY (OUTPATIENT)
Dept: REHABILITATION | Facility: HOSPITAL | Age: 49
End: 2019-10-14

## 2019-10-14 DIAGNOSIS — M53.86 DECREASED RANGE OF MOTION OF LUMBAR SPINE: ICD-10-CM

## 2019-10-14 DIAGNOSIS — R29.898 WEAKNESS OF BOTH HIPS: ICD-10-CM

## 2019-10-14 DIAGNOSIS — M62.9 HAMSTRING TIGHTNESS OF BOTH LOWER EXTREMITIES: ICD-10-CM

## 2019-10-14 NOTE — PROGRESS NOTES
Face to Face PTA Conference performed with Ozzy Reddy, PTA regarding patient's current status, overall progress, and plan of care    Face to Face PTA Conference performed with Poornima Cruz, PT regarding patient's current status, overall progress, and plan of care    Ozzy Reddy  10/14/2019

## 2019-10-17 ENCOUNTER — CLINICAL SUPPORT (OUTPATIENT)
Dept: REHABILITATION | Facility: HOSPITAL | Age: 49
End: 2019-10-17
Payer: MEDICAID

## 2019-10-17 DIAGNOSIS — R29.898 WEAKNESS OF BOTH HIPS: ICD-10-CM

## 2019-10-17 DIAGNOSIS — M53.86 DECREASED RANGE OF MOTION OF LUMBAR SPINE: ICD-10-CM

## 2019-10-17 DIAGNOSIS — M62.9 HAMSTRING TIGHTNESS OF BOTH LOWER EXTREMITIES: ICD-10-CM

## 2019-10-17 PROCEDURE — 97110 THERAPEUTIC EXERCISES: CPT | Mod: PO

## 2019-10-17 NOTE — PROGRESS NOTES
"  Physical Therapy Daily Treatment Note     Name: Sonia Hicks  Clinic Number: [id number]    Therapy Diagnosis:   Encounter Diagnoses   Name Primary?    Decreased range of motion of lumbar spine     Hamstring tightness of both lower extremities     Weakness of both hips      Physician: Cynthia Gill NP    Visit Date: 10/17/2019    Physician Orders: PT Eval and Treat   Medical Diagnosis from Referral: Low back pain  Evaluation Date: 8/14/2019  Authorization Period Expiration: 10/31/2019  Plan of Care Expiration: 10/14/2019  Visit # / Visits authorized: 11/ 12    Time In: 8:08 am  Time Out: 8:55 am  Total Billable Time: 47 minutes    Precautions: Standard and Fall    Subjective     Pt reports: didn't come to Monday's appointment as she had has other appointments, her doctor is sending a referral for additional visits.   She was compliant with home exercise program.  Response to previous treatment: no soreness afterwards  Functional change: none    Pain: 6/10  Location:buttocks     Objective     Sonia received therapeutic exercises to develop strength, flexibility and core stabilization for 47 minutes including:    Sonia presented to the clinic with a level pelvis.     Date 10/17/19 10/10/19 10/07/19 09/26/19 09/16/19 09/12/19 09/09/19 08/26/19 08/22/19 08/19/19   Visit 11/12 10/12 9/12 8/12 7/12 6/12 5/12 4/12 3/12 2/12   FTF 11/14/19 10/12/19 10/12/19 10/12/19 10/12/19 10/12/19 09/14/19 09/14/19 9/14/19 9/14/19   FOTO -- -- -- -- --- -- 5/5 4/5 3/5 2/5   POC exp 10/14/19 10/14/19 10/14/19 10/14/19 10/14/119 10/14/19 10/14/19 10/14/19 10/14/19 10/14/19                Supine:             Hamstring str 10 x 10" 10 x 10" 10 x 10" 10 x 10" 10 x 10" 10 x 10" 10 x 10" 10 x 10" 10 x 10" 10 x 10"   Piriformis str 10 x 10" 10 x 10" 10 x 10" 10 x 10" 10 x 10" 5 x 10" Not today Not today 10 x 10" 10 x 10"   TAs 10 x 5" 10 x 5" 10 x 5" 10 x 5" 10 x 5" 10 x 5" 10 x 5" 10 x 5" 10 x 5" 10 x 5"   LTR 1 x 15 1 x 15 1 x 15 1 x 15 1 x " "15 1 x 15 1 x 15 1 x 15 1 x 10 1 x 10   March 2 x 10 2 x 10 2 x 10 Not today 2 x 10 2 x 10 1 x 15 1 x 15 1 x 15 1 x 10   Bridge  1 x 25 1 x  25 1 x 25 1 x 25 2 x 10 2 x 10 2 x 10 1 x 15 1 x 10   SLR 2 x 10 x 1# 2 x 10 x 1# 2 x 10 x 1# 2 x 10 x 1# 3 x 10 3 x 10 3 x 10 2 x 10 1 x 15 1 x 10   SL hip add 2 x 10 x 1# 2 x 10 x 1# 2 x 10 x 1# 2 x 10 x 1# 3 x 10 2 x 10 2 x 10 1 x 10 Next?    SL hip abd 2 x 10 x 1# 2 x 10 x 1# 2 x 10 x 1# 2 x 10 x 1# 3 x 10 2 x 10 2 x 10 1 x 10 Next?    Prone:             Prone prop 10 x 5" 10 x 5" 10 x 5" 10 x 5" 10 x 5" 10 x 5" 10 x 5" 10 x 5" 5 x 5" Next?   Hip ext 2 x 10 2 x 10 2 x 10 2 x 10 2 x 10 1 x 10 --- --- -- --   Seated:             March 2 x 10 x 1# 2 x 10 x 1# 3 x 10 3 x 10 2 x 10 2 x 10 2 x 10 1 x 15 1 x 10 Next?   LAQs 2 x 10 x 1# 2 x 10 x 1# 2 x 10 Not today 2 x 10 2 x 10 2 x 10 1 x 15 1 x 10 --   Stand             Step ups -- -- -- -- --- -- --- --- --    Lunges -- -- -- -- --- -- --- --- --                 Initials DG DG DG DG GWA 1/6 DG GWA 2/6 GWA 1/6 DG DG     Lumbar Range of Motion:     Degrees Pain   Flexion 60 Pulling L thoracolumbar junction to sacrum   Extension 15 Pulling L lumbar region   Left Side Bending 30 L QL   Right Side Bending 20 Across lumbar region         Lower Extremity Strength  Right LE   Left LE     Knee extension: 5/5 Knee extension: 5/5   Knee flexion: 4-/5 Knee flexion: 4-/5 aching pain   Hip flexion: 4/5 pain R glute Hip flexion: 4/5   Hip extension:  5/5 Hip extension: 5/5   Hip abduction: 4/5 Hip abduction: 4-/5   Hip adduction: 5/5 Hip adduction 5/5   Ankle dorsiflexion: 5/5 Ankle dorsiflexion: 5/5   Ankle plantarflexion: 5/5 Ankle plantarflexion: 5/5     Home Exercises Provided and Patient Education Provided     Education provided:   -continue with PT per insurance auth.     Written Home Exercises Provided: yes.  Exercises were reviewed and Sonia was able to demonstrate them prior to the end of the session.  Sonia demonstrated fair  " understanding of the education provided.     See EMR under Patient Instructions for exercises provided 08/26/2019..    Assessment     Sonia positions herself in supine with a towel under her buttocks and pillow under her head and shoulders to accentuate extension due to complaints of pain but also continues to have complaints of pain with prone extension positions. She is able to demonstrate active range of motion of her lumbar spine WFL but continues to have complaints of pain with all movements. Her lower extremity strength has remained virtually the same as her initial evaluation but is able to perform her mat exercises with 1 lb ankle weight with no difficulty. She also performed supine to/from long sitting several times during her visit with no complaints of pain or difficulty. She performed all of today's activities with no increase in symptoms prior to leaving the clinic.   Sonia is progressing well towards her goals.   Pt prognosis is Fair.     Pt will continue to benefit from skilled outpatient physical therapy to address the deficits listed in the problem list box on initial evaluation, provide pt/family education and to maximize pt's level of independence in the home and community environment.     Pt's spiritual, cultural and educational needs considered and pt agreeable to plan of care and goals.     Anticipated barriers to physical therapy: none    Goals:   Short Term Goals: 4 weeks   1. This patient will be independent with a basic HEP. Met  2. This patient will increase lumbar range of motion by 10 degrees in order to be able to assist her children with ADLs with no increase in symptoms. In progress  3. This patient will increase B LE strength by 1/2 grade in order to be able to perform vehicle transfers with no increase in symptoms. In progress  4. This patient will have a pain rating of 6/10 at worst with ADLs.In progress  5. Patient able to score greater than or equal to 34 on the FOTO Lumbar Spine  Survey placing patient in 60%<80% impaired, limited, or restricted category demonstrating overall decreased low back pain with functional activities.In progress    Long Term Goals: 8 weeks   1. This patient will be independent with an updated HEP. In progress  2. This patient will increase B LE strength to 5/5 in order to be able to perform her usual household duties with no increase in symptoms. In progress  3. This patient will have a pain rating of 3/10 at worst with ADLs.In progress  4. Patient able to score greater than or equal to 44 on the FOTO Lumbar Spine Survey placing patient in 40%<60% impaired, limited, or restricted category demonstrating overall decreased low back pain with functional activities.In progress    Plan][     Reassess patient next visit for discharge to HEP.    Poornima Cruz, PT

## 2019-10-21 NOTE — PLAN OF CARE
Outpatient Therapy Updated Plan of Care     Visit Date: 10/17/2019  Name: Sonia Hicks  Clinic Number: [id number]    Therapy Diagnosis:   Encounter Diagnoses   Name Primary?    Decreased range of motion of lumbar spine     Hamstring tightness of both lower extremities     Weakness of both hips      Physician: Cynthia Gill, NP    Physician Orders: PT Eval and Treat   Medical Diagnosis from Referral: Low back pain  Evaluation Date: 8/14/2019    Total Visits Received: 11  Cancelled Visits: 0  No Show Visits: 7    Current Certification Period:  08/14/2019 to 10/14/2019  Precautions:  Standard and Fall  Visits from Evaluation Date:  18  Functional Level Prior to Evaluation:  independent    Subjective     Update: Pt reports: didn't come to Monday's appointment as she had has other appointments, her doctor is sending a referral for additional visits.   She was compliant with home exercise program.  Response to previous treatment: no soreness afterwards  Functional change: none     Pain: 6/10  Location:buttocks     Objective     Update: Lumbar Range of Motion: 10/17/19    Degrees Pain   Flexion 60 Pulling L thoracolumbar junction to sacrum   Extension 15 Pulling L lumbar region   Left Side Bending 30 L QL   Right Side Bending 20 Across lumbar region         Lower Extremity Strength  Right LE   Left LE     Knee extension: 5/5 Knee extension: 5/5   Knee flexion: 4-/5 Knee flexion: 4-/5 aching pain   Hip flexion: 4/5 pain R glute Hip flexion: 4/5   Hip extension:  5/5 Hip extension: 5/5   Hip abduction: 4/5 Hip abduction: 4-/5   Hip adduction: 5/5 Hip adduction 5/5   Ankle dorsiflexion: 5/5 Ankle dorsiflexion: 5/5   Ankle plantarflexion: 5/5 Ankle plantarflexion: 5/5     Lumbar Range of Motion: 08/14/2019    Degrees Pain   Flexion 45 Pulling in hamstrings and buttocks   Extension 5 Pulling down lumbar region   Left Side Bending 25 L QL   Right Side Bending 30 Across lumbar         Lower Extremity Strength  Right LE   Left  LE     Knee extension: 5/5 Knee extension: 5/5   Knee flexion: 5/5 Knee flexion: 4-/5 aching pain   Hip flexion: 4-/5 pain R glute Hip flexion: 4-/5   Hip extension:  4/5 Hip extension: 4/5   Hip abduction: 4/5 Hip abduction: 4-/5   Hip adduction: 4/5 Hip adduction 5/5   Ankle dorsiflexion: 5/5 Ankle dorsiflexion: 5/5   Ankle plantarflexion: 5/5 Ankle plantarflexion: 5/5         Assessment     Update: Sonia positions herself in supine with a towel under her buttocks and pillow under her head and shoulders to accentuate extension due to complaints of pain but also continues to have complaints of pain with prone extension positions. She is able to demonstrate active range of motion of her lumbar spine WFL but continues to have complaints of pain with all movements. Her lower extremity strength has remained virtually the same as her initial evaluation but is able to perform her mat exercises with 1 lb ankle weight with no difficulty. She also performed supine to/from long sitting several times during her visit with no complaints of pain or difficulty. She performed all of today's activities with no increase in symptoms prior to leaving the clinic.     Previous Short Term Goals Status:   Short Term Goals: 4 weeks   1. This patient will be independent with a basic HEP. Met  2. This patient will increase lumbar range of motion by 10 degrees in order to be able to assist her children with ADLs with no increase in symptoms. In progress  3. This patient will increase B LE strength by 1/2 grade in order to be able to perform vehicle transfers with no increase in symptoms. In progress  4. This patient will have a pain rating of 6/10 at worst with ADLs.In progress  5. Patient able to score greater than or equal to 34 on the FOTO Lumbar Spine Survey placing patient in 60%<80% impaired, limited, or restricted category demonstrating overall decreased low back pain with functional activities.In progress    Long Term Goals: 8 weeks    1. This patient will be independent with an updated HEP. In progress  2. This patient will increase B LE strength to 5/5 in order to be able to perform her usual household duties with no increase in symptoms. In progress  3. This patient will have a pain rating of 3/10 at worst with ADLs.In progress  4. Patient able to score greater than or equal to 44 on the FOTO Lumbar Spine Survey placing patient in 40%<60% impaired, limited, or restricted category demonstrating overall decreased low back pain with functional activities.In progress  New Short Term Goals Status:   Continue with STG #2-5,& LTG #1-4  Long Term Goal Status:   continue per initial plan of care.  Reasons for Recertification of Therapy:   Continued complaints of pain with all ranges of motion, weakness    Plan     Updated Certification Period: 10/17/2019 to 10/31/2019  Recommended Treatment Plan: 2 times per week for 2 weeks: Manual Therapy, Moist Heat/ Ice, Neuromuscular Re-ed, Patient Education, Therapeutic Activites, Therapeutic Exercise and IASTM. Dry needling with manual therapy techniques to decrease pain, inflammation and swelling, increase circulation and promote healing process.  Other Recommendations: none    Poornima Cruz, PT  10/17/2019      I CERTIFY THE NEED FOR THESE SERVICES FURNISHED UNDER THIS PLAN OF TREATMENT AND WHILE UNDER MY CARE    Physician's comments:        Physician's Signature: ___________________________________________________

## 2019-10-24 ENCOUNTER — CLINICAL SUPPORT (OUTPATIENT)
Dept: REHABILITATION | Facility: HOSPITAL | Age: 49
End: 2019-10-24
Payer: MEDICAID

## 2019-10-24 DIAGNOSIS — M53.86 DECREASED RANGE OF MOTION OF LUMBAR SPINE: ICD-10-CM

## 2019-10-24 DIAGNOSIS — M62.9 HAMSTRING TIGHTNESS OF BOTH LOWER EXTREMITIES: ICD-10-CM

## 2019-10-24 DIAGNOSIS — R29.898 WEAKNESS OF BOTH HIPS: ICD-10-CM

## 2019-10-24 PROCEDURE — 97110 THERAPEUTIC EXERCISES: CPT | Mod: PO

## 2019-10-24 NOTE — PROGRESS NOTES
"  Physical Therapy Daily Treatment Note     Name: Sonia Hicks  Clinic Number: [id number]    Therapy Diagnosis:   Encounter Diagnoses   Name Primary?    Decreased range of motion of lumbar spine     Hamstring tightness of both lower extremities     Weakness of both hips      Physician: Cynthia Gill NP    Visit Date: 10/24/2019    Physician Orders: PT Eval and Treat   Medical Diagnosis from Referral: Low back pain  Evaluation Date: 8/14/2019  Authorization Period Expiration: 10/31/2019  Plan of Care Expiration: 10/14/2019  Visit # / Visits authorized: 12/ 16    Time In: 9:00 am  Time Out: 9:45 am  Total Billable Time: 30 minutes    Precautions: Standard and Fall    Subjective     Pt reports: constant pain in low back but no increased symptoms during or following therapy.   She was compliant with home exercise program.  Response to previous treatment: no soreness afterwards  Functional change: none    Pain: 6/10  Location:buttocks     Objective     Sonia received therapeutic exercises to develop strength, flexibility and core stabilization for 45 minutes including:    Sonia presented to the clinic with a level pelvis.     Date 10/24/19 10/17/19 10/10/19 10/07/19 09/26/19 09/16/19 09/12/19 09/09/19 08/26/19 08/22/19 08/19/19   Visit 12/16 11/12 10/12 9/12 8/12 7/12 6/12 5/12 4/12 3/12 2/12   FTF 11/14/19 11/14/19 10/12/19 10/12/19 10/12/19 10/12/19 10/12/19 09/14/19 09/14/19 9/14/19 9/14/19   FOTO -- -- -- -- -- --- -- 5/5 4/5 3/5 2/5   POC exp 10/31/19 10/14/19 10/14/19 10/14/19 10/14/19 10/14/119 10/14/19 10/14/19 10/14/19 10/14/19 10/14/19                 Supine:              Hamstring str 10 x 10" 10 x 10" 10 x 10" 10 x 10" 10 x 10" 10 x 10" 10 x 10" 10 x 10" 10 x 10" 10 x 10" 10 x 10"   Piriformis str 10 x 10" 10 x 10" 10 x 10" 10 x 10" 10 x 10" 10 x 10" 5 x 10" Not today Not today 10 x 10" 10 x 10"   TAs 10 x 5" 10 x 5" 10 x 5" 10 x 5" 10 x 5" 10 x 5" 10 x 5" 10 x 5" 10 x 5" 10 x 5" 10 x 5"   LTR 1 x 15 1 x 15 1 " "x 15 1 x 15 1 x 15 1 x 15 1 x 15 1 x 15 1 x 15 1 x 10 1 x 10   March 2 x 10 2 x 10 2 x 10 2 x 10 Not today 2 x 10 2 x 10 1 x 15 1 x 15 1 x 15 1 x 10   Bridge 1 x 25  1 x 25 1 x  25 1 x 25 1 x 25 2 x 10 2 x 10 2 x 10 1 x 15 1 x 10   SLR 2 x 10 x 1.5# 2 x 10 x 1# 2 x 10 x 1# 2 x 10 x 1# 2 x 10 x 1# 3 x 10 3 x 10 3 x 10 2 x 10 1 x 15 1 x 10   SL hip add Not today 2 x 10 x 1# 2 x 10 x 1# 2 x 10 x 1# 2 x 10 x 1# 3 x 10 2 x 10 2 x 10 1 x 10 Next?    SL hip abd 2 x 10 x 1.5# 2 x 10 x 1# 2 x 10 x 1# 2 x 10 x 1# 2 x 10 x 1# 3 x 10 2 x 10 2 x 10 1 x 10 Next?    Prone:              Prone prop 10 x 5" 10 x 5" 10 x 5" 10 x 5" 10 x 5" 10 x 5" 10 x 5" 10 x 5" 10 x 5" 5 x 5" Next?   Hip ext 2 x 10 2 x 10 2 x 10 2 x 10 2 x 10 2 x 10 1 x 10 --- --- -- --   Seated:              March 2 x 10 x 1.5# 2 x 10 x 1# 2 x 10 x 1# 3 x 10 3 x 10 2 x 10 2 x 10 2 x 10 1 x 15 1 x 10 Next?   LAQs 2 x 10 x 1.5# 2 x 10 x 1# 2 x 10 x 1# 2 x 10 Not today 2 x 10 2 x 10 2 x 10 1 x 15 1 x 10 --   Stand              Step ups -- -- -- -- -- --- -- --- --- --    Lunges -- -- -- -- -- --- -- --- --- --                  Initials MA FREDRICK DG DG DG GWA 1/6 DG GWA 2/6 GWA 1/6 DG DG     Home Exercises Provided and Patient Education Provided     Education provided:   -continue with PT per insurance auth.     Written Home Exercises Provided: yes.  Exercises were reviewed and Sonia was able to demonstrate them prior to the end of the session.  Sonia demonstrated fair  understanding of the education provided.     See EMR under Patient Instructions for exercises provided 08/26/2019..    Assessment     Sonia returns to therapy on this date with continued complaints of constant pain across low back and down left LE to knee.  She is progressed with LE strengthening exercises with no complaints of increased pain during or following today's treatment.  Patient will continue with progression of core strengthening exercises as tolerated to improve stability during normal daily " activities.   Sonia is progressing well towards her goals.   Pt prognosis is Fair.     Pt will continue to benefit from skilled outpatient physical therapy to address the deficits listed in the problem list box on initial evaluation, provide pt/family education and to maximize pt's level of independence in the home and community environment.     Pt's spiritual, cultural and educational needs considered and pt agreeable to plan of care and goals.     Anticipated barriers to physical therapy: none    Goals:   Short Term Goals: 4 weeks   1. This patient will be independent with a basic HEP. Met  2. This patient will increase lumbar range of motion by 10 degrees in order to be able to assist her children with ADLs with no increase in symptoms. In progress  3. This patient will increase B LE strength by 1/2 grade in order to be able to perform vehicle transfers with no increase in symptoms. In progress  4. This patient will have a pain rating of 6/10 at worst with ADLs.In progress  5. Patient able to score greater than or equal to 34 on the FOTO Lumbar Spine Survey placing patient in 60%<80% impaired, limited, or restricted category demonstrating overall decreased low back pain with functional activities.In progress    Long Term Goals: 8 weeks   1. This patient will be independent with an updated HEP. In progress  2. This patient will increase B LE strength to 5/5 in order to be able to perform her usual household duties with no increase in symptoms. In progress  3. This patient will have a pain rating of 3/10 at worst with ADLs.In progress  4. Patient able to score greater than or equal to 44 on the FOTO Lumbar Spine Survey placing patient in 40%<60% impaired, limited, or restricted category demonstrating overall decreased low back pain with functional activities.In progress    Plan][     Progress core stabilization exercises.    Roberto Dhillon, PT

## 2019-10-31 ENCOUNTER — CLINICAL SUPPORT (OUTPATIENT)
Dept: REHABILITATION | Facility: HOSPITAL | Age: 49
End: 2019-10-31
Payer: MEDICAID

## 2019-10-31 DIAGNOSIS — R29.898 WEAKNESS OF BOTH HIPS: ICD-10-CM

## 2019-10-31 DIAGNOSIS — M62.9 HAMSTRING TIGHTNESS OF BOTH LOWER EXTREMITIES: ICD-10-CM

## 2019-10-31 DIAGNOSIS — M53.86 DECREASED RANGE OF MOTION OF LUMBAR SPINE: ICD-10-CM

## 2019-10-31 PROCEDURE — 97110 THERAPEUTIC EXERCISES: CPT | Mod: PO

## 2019-10-31 NOTE — PROGRESS NOTES
"  Physical Therapy Daily Treatment Note     Name: Sonia ROSS Kurt  Clinic Number: [id number]    Therapy Diagnosis:   Encounter Diagnoses   Name Primary?    Decreased range of motion of lumbar spine     Hamstring tightness of both lower extremities     Weakness of both hips      Physician: Cynthia Gill NP    Visit Date: 10/31/2019    Physician Orders: PT Eval and Treat   Medical Diagnosis from Referral: Low back pain  Evaluation Date: 8/14/2019  Authorization Period Expiration: 10/31/2019  Plan of Care Expiration: 10/14/2019  Visit # / Visits authorized: 13/ 16    Time In: 8:00 am  Time Out: 9:00 am  Total Billable Time: 60 minutes    Precautions: Standard and Fall    Subjective     Pt reports: she has been in pain since the weather changed. She is feeling it in her L leg to knee today.  She was compliant with home exercise program.  Response to previous treatment: no soreness afterwards  Functional change: none    Pain: 6/10  Location:buttocks     Objective     Sonia received therapeutic exercises to develop strength, flexibility and core stabilization, along with reassessment for 45 minutes including:    Sonia presented to the clinic with a level pelvis.     Date 10/31/19 10/24/19 10/17/19 10/10/19 10/07/19 09/26/19 09/16/19 09/12/19 09/09/19 08/26/19 08/22/19 08/19/19   Visit 13/16 12/16 11/12 10/12 9/12 8/12 7/12 6/12 5/12 4/12 3/12 2/12   FTF 11/14/19 11/14/19 11/14/19 10/12/19 10/12/19 10/12/19 10/12/19 10/12/19 09/14/19 09/14/19 9/14/19 9/14/19   FOTO -- -- -- -- -- -- --- -- 5/5 4/5 3/5 2/5   POC exp 10/31/19 10/31/19 10/14/19 10/14/19 10/14/19 10/14/19 10/14/119 10/14/19 10/14/19 10/14/19 10/14/19 10/14/19                  Supine:               Hamstring str 10 x 10" 10 x 10" 10 x 10" 10 x 10" 10 x 10" 10 x 10" 10 x 10" 10 x 10" 10 x 10" 10 x 10" 10 x 10" 10 x 10"   Piriformis str At home 10 x 10" 10 x 10" 10 x 10" 10 x 10" 10 x 10" 10 x 10" 5 x 10" Not today Not today 10 x 10" 10 x 10"   TAs At home 10 x 5" " "10 x 5" 10 x 5" 10 x 5" 10 x 5" 10 x 5" 10 x 5" 10 x 5" 10 x 5" 10 x 5" 10 x 5"   LTR 1 x 15 1 x 15 1 x 15 1 x 15 1 x 15 1 x 15 1 x 15 1 x 15 1 x 15 1 x 15 1 x 10 1 x 10   March At home 2 x 10 2 x 10 2 x 10 2 x 10 Not today 2 x 10 2 x 10 1 x 15 1 x 15 1 x 15 1 x 10   Bridge At home 1 x 25  1 x 25 1 x  25 1 x 25 1 x 25 2 x 10 2 x 10 2 x 10 1 x 15 1 x 10   SLR At home 2 x 10 x 1.5# 2 x 10 x 1# 2 x 10 x 1# 2 x 10 x 1# 2 x 10 x 1# 3 x 10 3 x 10 3 x 10 2 x 10 1 x 15 1 x 10   SL hip add 1 x 15 Not today 2 x 10 x 1# 2 x 10 x 1# 2 x 10 x 1# 2 x 10 x 1# 3 x 10 2 x 10 2 x 10 1 x 10 Next?    SL hip abd 1 x 15 2 x 10 x 1.5# 2 x 10 x 1# 2 x 10 x 1# 2 x 10 x 1# 2 x 10 x 1# 3 x 10 2 x 10 2 x 10 1 x 10 Next?    Prone:               Prone prop 10 x 5" 10 x 5" 10 x 5" 10 x 5" 10 x 5" 10 x 5" 10 x 5" 10 x 5" 10 x 5" 10 x 5" 5 x 5" Next?   Hip ext 2 x 10 2 x 10 2 x 10 2 x 10 2 x 10 2 x 10 2 x 10 1 x 10 --- --- -- --   Seated:               March 2 x 10  2 x 10 x 1.5# 2 x 10 x 1# 2 x 10 x 1# 3 x 10 3 x 10 2 x 10 2 x 10 2 x 10 1 x 15 1 x 10 Next?   LAQs 2 x 10 2 x 10 x 1.5# 2 x 10 x 1# 2 x 10 x 1# 2 x 10 Not today 2 x 10 2 x 10 2 x 10 1 x 15 1 x 10 --   Stand               Step ups -- -- -- -- -- -- --- -- --- --- --    Lunges -- -- -- -- -- -- --- -- --- --- --                   Initials FREDRICK ALCALA DG GWA 1/6 DG GWA 2/6 GWA 1/6 DG DG     Update: Lumbar Range of Motion: 10/17/19    Degrees Pain   Flexion 70 Pulling at thoracolumbar junction   Extension 20 Across lumbar region   Left Side Bending 20 Up/down lumbar spine   Right Side Bending 20 Up/down lumbar  spine         Lower Extremity Strength  Right LE   Left LE     Knee extension: 5/5 Knee extension: 4/5   Knee flexion: 5/5 Knee flexion: 5/5   Hip flexion: 4+/5 Hip flexion: 4+/5   Hip extension:  5/5 Hip extension: 5/5   Hip abduction: 5/5 Hip abduction: 3+/5   Hip adduction: 3/5 Hip adduction 5/5   Ankle dorsiflexion: 5/5 Ankle dorsiflexion: 4/5   Ankle plantarflexion: 5/5 " Ankle plantarflexion: 5/5     FOTO Lumbar Spine 68% impaired, limited, or restricted category.  Home Exercises Provided and Patient Education Provided     Education provided:   -continue with PT per insurance auth.     Written Home Exercises Provided: yes.  Exercises were reviewed and Sonia was able to demonstrate them prior to the end of the session.  Sonia demonstrated fair  understanding of the education provided.     See EMR under Patient Instructions for exercises provided 08/26/2019..    Assessment     Sonia demonstrates an increase in lumbar AROM but continues to report difficulty with ADLs. Her B LE strength has remained virtually the same as her initial evaluation despite being able to use 1 lb ankle weight with her exercises. Even though she reports performing her HEP daily she continues to require verbal cues for correct positioning with all exercises. When laying in supine she places a towel roll under her sacrum to increase extension due to complaints of pain, but when in prone elbow props to encourage extension she reports discomfort and no change in symptoms. Her current score on FOTO Lumbar Spine places her in the 60%<80% impaired, limited, or restricted category, which is unchanged from her initial evaluation. She has reached her max rehab potential at this time and is ready for discharge to her HEP.   Sonia is progressing well towards her goals.   Pt prognosis is Fair.     Pt's spiritual, cultural and educational needs considered and pt agreeable to plan of care and goals.     Anticipated barriers to physical therapy: none    Goals:   Short Term Goals: 4 weeks   1. This patient will be independent with a basic HEP. Met  2. This patient will increase lumbar range of motion by 10 degrees in order to be able to assist her children with ADLs with no increase in symptoms. Met  3. This patient will increase B LE strength by 1/2 grade in order to be able to perform vehicle transfers with no increase in  symptoms. Partially met  4. This patient will have a pain rating of 6/10 at worst with ADLs. Partially met  5. Patient able to score greater than or equal to 34 on the FOTO Lumbar Spine Survey placing patient in 60%<80% impaired, limited, or restricted category demonstrating overall decreased low back pain with functional activities. Partially met    Long Term Goals: 8 weeks   1. This patient will be independent with an updated HEP. Partially met  2. This patient will increase B LE strength to 5/5 in order to be able to perform her usual household duties with no increase in symptoms. Partially met  3. This patient will have a pain rating of 3/10 at worst with ADLs. Partially met  4. Patient able to score greater than or equal to 44 on the FOTO Lumbar Spine Survey placing patient in 40%<60% impaired, limited, or restricted category demonstrating overall decreased low back pain with functional activities.Partially met    Plan][     Discharge to HEP.    Poornima Cruz, PT

## 2019-11-03 NOTE — PLAN OF CARE
Outpatient Therapy Discharge Summary     Name: Sonia Hicks  [facility] Number: [id number]    Therapy Diagnosis:   Encounter Diagnoses   Name Primary?    Decreased range of motion of lumbar spine     Hamstring tightness of both lower extremities     Weakness of both hips      Physician: Cynthia Gill, NP    Physician Orders: PT Eval and Treat   Medical Diagnosis from Referral: Low back pain  Evaluation Date: 8/14/2019      Date of Last visit: 10/31/2019  Total Visits Received: 13  Cancelled Visits: 0  No Show Visits: 8    Assessment    Sonia demonstrates an increase in lumbar AROM but continues to report difficulty with ADLs. Her B LE strength has remained virtually the same as her initial evaluation despite being able to use 1 lb ankle weight with her exercises. Even though she reports performing her HEP daily she continues to require verbal cues for correct positioning with all exercises. When laying in supine she places a towel roll under her sacrum to increase extension due to complaints of pain, but when in prone elbow props to encourage extension she reports discomfort and no change in symptoms. Her current score on FOTO Lumbar Spine places her in the 60%<80% impaired, limited, or restricted category, which is unchanged from her initial evaluation. She has reached her max rehab potential at this time and is ready for discharge to her HEP.   Goals:   Short Term Goals: 4 weeks   1. This patient will be independent with a basic HEP. Met  2. This patient will increase lumbar range of motion by 10 degrees in order to be able to assist her children with ADLs with no increase in symptoms. Met  3. This patient will increase B LE strength by 1/2 grade in order to be able to perform vehicle transfers with no increase in symptoms. Partially met  4. This patient will have a pain rating of 6/10 at worst with ADLs. Partially met  5. Patient able to score greater than or equal to 34 on the FOTO Lumbar Spine Survey placing  patient in 60%<80% impaired, limited, or restricted category demonstrating overall decreased low back pain with functional activities. Partially met    Long Term Goals: 8 weeks   1. This patient will be independent with an updated HEP. Partially met  2. This patient will increase B LE strength to 5/5 in order to be able to perform her usual household duties with no increase in symptoms. Partially met  3. This patient will have a pain rating of 3/10 at worst with ADLs. Partially met  4. Patient able to score greater than or equal to 44 on the FOTO Lumbar Spine Survey placing patient in 40%<60% impaired, limited, or restricted category demonstrating overall decreased low back pain with functional activities.Partially met    Discharge reason: Patient has reached the maximum rehab potential for the present time    Plan   This patient is discharged from Physical Therapy

## 2019-11-04 PROBLEM — M53.86 DECREASED RANGE OF MOTION OF LUMBAR SPINE: Status: RESOLVED | Noted: 2019-08-18 | Resolved: 2019-11-04

## 2019-11-04 PROBLEM — R29.898 WEAKNESS OF BOTH HIPS: Status: RESOLVED | Noted: 2019-08-18 | Resolved: 2019-11-04

## 2019-11-04 PROBLEM — M62.9 HAMSTRING TIGHTNESS OF BOTH LOWER EXTREMITIES: Status: RESOLVED | Noted: 2019-08-18 | Resolved: 2019-11-04

## 2020-02-07 ENCOUNTER — HOSPITAL ENCOUNTER (EMERGENCY)
Facility: HOSPITAL | Age: 50
Discharge: HOME | End: 2020-02-07
Attending: EMERGENCY MEDICINE
Payer: MEDICAID

## 2020-02-07 VITALS
HEART RATE: 86 BPM | WEIGHT: 139 LBS | OXYGEN SATURATION: 98 % | TEMPERATURE: 99 F | HEIGHT: 61 IN | BODY MASS INDEX: 26.24 KG/M2 | SYSTOLIC BLOOD PRESSURE: 131 MMHG | DIASTOLIC BLOOD PRESSURE: 82 MMHG | RESPIRATION RATE: 18 BRPM

## 2020-02-07 DIAGNOSIS — R07.9 CHEST PAIN: ICD-10-CM

## 2020-02-07 LAB
ALBUMIN SERPL BCP-MCNC: 4.4 G/DL (ref 3.5–5.2)
ALP SERPL-CCNC: 83 U/L (ref 38–126)
ALT SERPL W/O P-5'-P-CCNC: 12 U/L (ref 10–44)
ANION GAP SERPL CALC-SCNC: 5 MMOL/L (ref 8–16)
AST SERPL-CCNC: 22 U/L (ref 15–46)
B-HCG UR QL: NEGATIVE
BASOPHILS # BLD AUTO: 0.03 K/UL (ref 0–0.2)
BASOPHILS NFR BLD: 0.4 % (ref 0–1.9)
BILIRUB SERPL-MCNC: 0.4 MG/DL (ref 0.1–1)
BILIRUB UR QL STRIP: NEGATIVE
BUN SERPL-MCNC: 11 MG/DL (ref 7–17)
CALCIUM SERPL-MCNC: 10.4 MG/DL (ref 8.7–10.5)
CHLORIDE SERPL-SCNC: 104 MMOL/L (ref 95–110)
CLARITY UR REFRACT.AUTO: CLEAR
CO2 SERPL-SCNC: 29 MMOL/L (ref 23–29)
COLOR UR AUTO: YELLOW
CREAT SERPL-MCNC: 0.69 MG/DL (ref 0.5–1.4)
DIFFERENTIAL METHOD: ABNORMAL
EOSINOPHIL # BLD AUTO: 0.1 K/UL (ref 0–0.5)
EOSINOPHIL NFR BLD: 0.6 % (ref 0–8)
ERYTHROCYTE [DISTWIDTH] IN BLOOD BY AUTOMATED COUNT: 13.2 % (ref 11.5–14.5)
EST. GFR  (AFRICAN AMERICAN): >60 ML/MIN/1.73 M^2
EST. GFR  (NON AFRICAN AMERICAN): >60 ML/MIN/1.73 M^2
GLUCOSE SERPL-MCNC: 103 MG/DL (ref 70–110)
GLUCOSE UR QL STRIP: NEGATIVE
HCT VFR BLD AUTO: 38.4 % (ref 37–48.5)
HGB BLD-MCNC: 12 G/DL (ref 12–16)
HGB UR QL STRIP: NEGATIVE
IMM GRANULOCYTES # BLD AUTO: 0.01 K/UL (ref 0–0.04)
IMM GRANULOCYTES NFR BLD AUTO: 0.1 % (ref 0–0.5)
INFLUENZA A, MOLECULAR: NEGATIVE
INFLUENZA B, MOLECULAR: NEGATIVE
KETONES UR QL STRIP: NEGATIVE
LEUKOCYTE ESTERASE UR QL STRIP: NEGATIVE
LYMPHOCYTES # BLD AUTO: 2.7 K/UL (ref 1–4.8)
LYMPHOCYTES NFR BLD: 33.7 % (ref 18–48)
MCH RBC QN AUTO: 25.8 PG (ref 27–31)
MCHC RBC AUTO-ENTMCNC: 31.3 G/DL (ref 32–36)
MCV RBC AUTO: 83 FL (ref 82–98)
MONOCYTES # BLD AUTO: 0.6 K/UL (ref 0.3–1)
MONOCYTES NFR BLD: 7 % (ref 4–15)
NEUTROPHILS # BLD AUTO: 4.7 K/UL (ref 1.8–7.7)
NEUTROPHILS NFR BLD: 58.2 % (ref 38–73)
NITRITE UR QL STRIP: NEGATIVE
NRBC BLD-RTO: 0 /100 WBC
PH UR STRIP: 7 [PH] (ref 5–8)
PLATELET # BLD AUTO: 224 K/UL (ref 150–350)
PMV BLD AUTO: 9.4 FL (ref 9.2–12.9)
POTASSIUM SERPL-SCNC: 4.2 MMOL/L (ref 3.5–5.1)
PROT SERPL-MCNC: 8.1 G/DL (ref 6–8.4)
PROT UR QL STRIP: NEGATIVE
RBC # BLD AUTO: 4.65 M/UL (ref 4–5.4)
SODIUM SERPL-SCNC: 138 MMOL/L (ref 136–145)
SP GR UR STRIP: 1.01 (ref 1–1.03)
SPECIMEN SOURCE: NORMAL
TROPONIN I SERPL DL<=0.01 NG/ML-MCNC: <0.012 NG/ML (ref 0.01–0.03)
TROPONIN I SERPL DL<=0.01 NG/ML-MCNC: <0.012 NG/ML (ref 0.01–0.03)
URN SPEC COLLECT METH UR: NORMAL
UROBILINOGEN UR STRIP-ACNC: 1 EU/DL
WBC # BLD AUTO: 8.02 K/UL (ref 3.9–12.7)

## 2020-02-07 PROCEDURE — 81003 URINALYSIS AUTO W/O SCOPE: CPT | Mod: ER

## 2020-02-07 PROCEDURE — 96375 TX/PRO/DX INJ NEW DRUG ADDON: CPT | Mod: ER

## 2020-02-07 PROCEDURE — 84484 ASSAY OF TROPONIN QUANT: CPT | Mod: ER

## 2020-02-07 PROCEDURE — 87502 INFLUENZA DNA AMP PROBE: CPT | Mod: ER

## 2020-02-07 PROCEDURE — 99284 EMERGENCY DEPT VISIT MOD MDM: CPT | Mod: 25,ER

## 2020-02-07 PROCEDURE — 96374 THER/PROPH/DIAG INJ IV PUSH: CPT | Mod: ER

## 2020-02-07 PROCEDURE — 85025 COMPLETE CBC W/AUTO DIFF WBC: CPT | Mod: ER

## 2020-02-07 PROCEDURE — 93005 ELECTROCARDIOGRAM TRACING: CPT | Mod: ER | Performed by: INTERNAL MEDICINE

## 2020-02-07 PROCEDURE — 93010 ELECTROCARDIOGRAM REPORT: CPT | Mod: ,,, | Performed by: INTERNAL MEDICINE

## 2020-02-07 PROCEDURE — 63600175 PHARM REV CODE 636 W HCPCS: Mod: ER | Performed by: EMERGENCY MEDICINE

## 2020-02-07 PROCEDURE — 25000003 PHARM REV CODE 250: Mod: ER | Performed by: EMERGENCY MEDICINE

## 2020-02-07 PROCEDURE — 80053 COMPREHEN METABOLIC PANEL: CPT | Mod: ER

## 2020-02-07 PROCEDURE — 93010 EKG 12-LEAD: ICD-10-PCS | Mod: ,,, | Performed by: INTERNAL MEDICINE

## 2020-02-07 PROCEDURE — 93005 ELECTROCARDIOGRAM TRACING: CPT | Mod: ER

## 2020-02-07 PROCEDURE — 81025 URINE PREGNANCY TEST: CPT | Mod: ER

## 2020-02-07 RX ORDER — KETOROLAC TROMETHAMINE 30 MG/ML
15 INJECTION, SOLUTION INTRAMUSCULAR; INTRAVENOUS
Status: COMPLETED | OUTPATIENT
Start: 2020-02-07 | End: 2020-02-07

## 2020-02-07 RX ORDER — METOCLOPRAMIDE HYDROCHLORIDE 5 MG/ML
10 INJECTION INTRAMUSCULAR; INTRAVENOUS
Status: COMPLETED | OUTPATIENT
Start: 2020-02-07 | End: 2020-02-07

## 2020-02-07 RX ORDER — ASPIRIN 325 MG
325 TABLET ORAL
Status: DISCONTINUED | OUTPATIENT
Start: 2020-02-07 | End: 2020-02-07 | Stop reason: HOSPADM

## 2020-02-07 RX ADMIN — LIDOCAINE HYDROCHLORIDE: 20 SOLUTION ORAL; TOPICAL at 03:02

## 2020-02-07 RX ADMIN — METOCLOPRAMIDE 10 MG: 5 INJECTION, SOLUTION INTRAMUSCULAR; INTRAVENOUS at 03:02

## 2020-02-07 RX ADMIN — KETOROLAC TROMETHAMINE 15 MG: 30 INJECTION, SOLUTION INTRAMUSCULAR at 03:02

## 2020-02-07 NOTE — DISCHARGE INSTRUCTIONS
Please arrange for a follow-up appointment with your primary care physician and/or a cardiologist.

## 2020-02-07 NOTE — ED PROVIDER NOTES
"Encounter Date: 2020       History     Chief Complaint   Patient presents with    Chest Pain     Pt c/o "hard" pain to chest and "heart" that radiates to right upper arm x "months".  States continually getting worse.  Pt reports + SOB, reports decreased appetite.      49-year-old female presents emergency department complaining of right lateral chest pain radiating to her right shoulder and shortness of breath.  States that she has been having the pain intermittently for several months.  However, it got somewhat worse yesterday and has been constant since yesterday.  It is worse with certain movements and positions as well as with deep breathing without alleviating factors.  Patient notes that she feels somewhat short winded.  Unable to further clarify.  Denies any cough or congestion.  Denies any exertional symptoms, states it does not get worse when she walks or goes up and down stairs.  Has not taken any medication for the symptoms.  States she came in because it got worse last night and has not stopped.  No other symptoms reported.        Review of patient's allergies indicates:  No Known Allergies  Past Medical History:   Diagnosis Date    High cholesterol     Hypertension     Ovarian cyst      Past Surgical History:   Procedure Laterality Date    ABDOMINAL SURGERY      COLONOSCOPY N/A 2018    Procedure: COLONOSCOPY Golytely;  Surgeon: Nina Padilla MD;  Location: [facility];  Service: Endoscopy;  Laterality: N/A;    oophorectomy       Family History   Problem Relation Age of Onset    Hypertension Mother     Diabetes Mother     Heart disease Mother          of MI    Liver disease Mother         failure    Heart disease Sister     Hypertension Brother     Diabetes Brother     Heart disease Sister      Social History     Tobacco Use    Smoking status: Current Some Day Smoker     Packs/day: 0.50     Years: 30.00     Pack years: 15.00     Types: Cigarettes    Smokeless tobacco: Never " Used   Substance Use Topics    Alcohol use: Yes     Comment: socailly    Drug use: Yes     Types: Marijuana     Comment: former THC     Review of Systems   Constitutional: Negative for chills, fatigue and fever.   HENT: Negative for congestion, sore throat and voice change.    Eyes: Negative for photophobia, pain and redness.   Respiratory: Positive for shortness of breath. Negative for cough and choking.    Cardiovascular: Positive for chest pain. Negative for palpitations and leg swelling.   Gastrointestinal: Negative for abdominal pain, diarrhea, nausea and vomiting.   Genitourinary: Negative for dysuria, frequency and urgency.   Musculoskeletal: Negative for back pain, neck pain and neck stiffness.   Neurological: Negative for light-headedness, numbness and headaches.   All other systems reviewed and are negative.      Physical Exam     Initial Vitals [02/07/20 1056]   BP Pulse Resp Temp SpO2   (!) 153/93 83 18 98.6 °F (37 °C) 96 %      MAP       --         Physical Exam    Nursing note and vitals reviewed.  Constitutional: She appears well-developed and well-nourished. No distress.   HENT:   Head: Normocephalic and atraumatic.   Mouth/Throat: Oropharynx is clear and moist.   Eyes: Conjunctivae and EOM are normal. Pupils are equal, round, and reactive to light.   Neck: Normal range of motion. Neck supple. No tracheal deviation present.   Cardiovascular: Normal rate, regular rhythm, normal heart sounds and intact distal pulses.   Pulmonary/Chest: Breath sounds normal. No respiratory distress. She has no wheezes. She has no rhonchi. She has no rales.   Abdominal: Soft. Bowel sounds are normal. She exhibits no distension. There is no tenderness.   Musculoskeletal: Normal range of motion. She exhibits no edema or tenderness.   Neurological: She is alert and oriented to person, place, and time. She has normal strength. No cranial nerve deficit. GCS score is 15. GCS eye subscore is 4. GCS verbal subscore is 5. GCS  motor subscore is 6.   Skin: Skin is warm and dry.         ED Course   Procedures  Labs Reviewed   CBC W/ AUTO DIFFERENTIAL - Abnormal; Notable for the following components:       Result Value    Mean Corpuscular Hemoglobin 25.8 (*)     Mean Corpuscular Hemoglobin Conc 31.3 (*)     All other components within normal limits   COMPREHENSIVE METABOLIC PANEL - Abnormal; Notable for the following components:    Anion Gap 5 (*)     All other components within normal limits   INFLUENZA A & B BY MOLECULAR   URINALYSIS   PREGNANCY TEST, URINE RAPID   TROPONIN I   TROPONIN I     EKG Readings: (Independently Interpreted)   Initial Reading: No STEMI. Previous EKG: Compared with most recent EKG Previous EKG Date: 4/17/19 (Minimal change) . Rhythm: Normal Sinus Rhythm. Heart Rate: 76. Ectopy: No Ectopy. Conduction: Normal. ST Segments: Normal ST Segments. T Waves: Normal. Axis: Normal.         X-Rays:   Independently Interpreted Readings:   Other Readings:  Chest x-ray interpreted by radiologist and visualized by me:    Imaging Results          X-Ray Chest PA And Lateral (Final result)  Result time 02/07/20 15:24:04    Final result by ANJEL Saleem Sr., MD (02/07/20 15:24:04)                 Impression:      Normal study.      Electronically signed by: Alessandro Saleem MD  Date:    02/07/2020  Time:    15:24             Narrative:    EXAMINATION:  XR CHEST PA AND LATERAL    CLINICAL HISTORY:  Chest pain, unspecified    COMPARISON:  04/16/2019    FINDINGS:  The size and contour of the heart are normal. The lungs are clear. There is no pneumothorax or pleural effusion.                                Medical Decision Making:   Initial Assessment:   49-year-old female presents emergency department complaining of chest pain and shortness of breath  Differential Diagnosis:   ACS, dissection, pneumonia, pneumothorax, musculoskeletal, GERD, pulmonary embolism  Independently Interpreted Test(s):   I have ordered and independently  interpreted X-rays - see prior notes.  I have ordered and independently interpreted EKG Reading(s) - see prior notes  Clinical Tests:   Lab Tests: Reviewed       <> Summary of Lab: Benign  ED Management:  Patient given an aspirin.  The given Toradol, Reglan, GI cocktail.  She is tolerating p.o. with stable vital signs. Her repeat troponin is negative. Informed her of results as well as plan to discharge with instructions on home management, prompt follow-up with primary care physician and cardiologist, reasons to return to the emergency room.    Additional MDM:   PERC Rule:   Age is greater than or equal to 50 = 1.0  Heart Rate is greater than or equal to 100 = 0.0  SaO2 on room air < 95% = 0.0  Unilateral leg swelling = 0.0  Hemoptysis = 0.0  Recent surgery or trauma = 0.0  Prior PE or DVT =  0.0  Hormone use = 0.00  PERC Score = 1    Well's Criteria Score:  -Clinical symptoms of DVT (leg swelling, pain with palpation) = 0.0  -Other diagnosis less likely than pulmonary embolism =            0.0  -Heart Rate >100 =   0.0  -Immobilization (= or > than 3 days) or surgery in the previous 4 weeks = 0.0  -Previous DVT/PE = 0.0  -Hemoptysis =          0.0  -Malignancy =           0.0  Well's Probability Score =    0                                    Clinical Impression:       ICD-10-CM ICD-9-CM   1. Chest pain R07.9 786.50         Disposition:   Disposition: Discharged  Condition: Stable                     Joao Caban MD  02/07/20 3345

## 2021-03-01 ENCOUNTER — HOSPITAL ENCOUNTER (EMERGENCY)
Facility: HOSPITAL | Age: 51
Discharge: HOME | End: 2021-03-01
Attending: FAMILY MEDICINE
Payer: MEDICAID

## 2021-03-01 VITALS
DIASTOLIC BLOOD PRESSURE: 93 MMHG | HEART RATE: 81 BPM | BODY MASS INDEX: 24.55 KG/M2 | TEMPERATURE: 98 F | RESPIRATION RATE: 18 BRPM | WEIGHT: 130 LBS | SYSTOLIC BLOOD PRESSURE: 160 MMHG | OXYGEN SATURATION: 100 % | HEIGHT: 61 IN

## 2021-03-01 DIAGNOSIS — H10.13 ALLERGIC CONJUNCTIVITIS OF BOTH EYES: Primary | ICD-10-CM

## 2021-03-01 DIAGNOSIS — S05.02XA ABRASION OF LEFT CORNEA, INITIAL ENCOUNTER: ICD-10-CM

## 2021-03-01 PROCEDURE — 25000003 PHARM REV CODE 250: Mod: ER | Performed by: FAMILY MEDICINE

## 2021-03-01 PROCEDURE — 99283 EMERGENCY DEPT VISIT LOW MDM: CPT | Mod: ER

## 2021-03-01 RX ORDER — TOBRAMYCIN AND DEXAMETHASONE 3; 1 MG/ML; MG/ML
2 SUSPENSION/ DROPS OPHTHALMIC
Status: COMPLETED | OUTPATIENT
Start: 2021-03-01 | End: 2021-03-01

## 2021-03-01 RX ORDER — PROPARACAINE HYDROCHLORIDE 5 MG/ML
1 SOLUTION/ DROPS OPHTHALMIC
Status: COMPLETED | OUTPATIENT
Start: 2021-03-01 | End: 2021-03-01

## 2021-03-01 RX ADMIN — FLUORESCEIN SODIUM 1 EACH: 1 STRIP OPHTHALMIC at 05:03

## 2021-03-01 RX ADMIN — PROPARACAINE HYDROCHLORIDE 1 DROP: 5 SOLUTION/ DROPS OPHTHALMIC at 05:03

## 2021-03-01 RX ADMIN — TOBRAMYCIN AND DEXAMETHASONE 2 DROP: 3; 1 SUSPENSION OPHTHALMIC at 06:03

## 2021-05-10 DIAGNOSIS — Z12.31 SCREENING MAMMOGRAM, ENCOUNTER FOR: Primary | ICD-10-CM

## 2021-05-20 ENCOUNTER — HOSPITAL ENCOUNTER (OUTPATIENT)
Dept: RADIOLOGY | Facility: HOSPITAL | Age: 51
Discharge: HOME | End: 2021-05-20
Attending: NURSE PRACTITIONER
Payer: MEDICAID

## 2021-05-20 DIAGNOSIS — Z12.31 SCREENING MAMMOGRAM, ENCOUNTER FOR: ICD-10-CM

## 2021-05-20 DIAGNOSIS — R92.8 OTH ABN AND INCONCLUSIVE FINDINGS ON DX IMAGING OF BREAST: Primary | ICD-10-CM

## 2021-05-20 PROCEDURE — 77067 SCR MAMMO BI INCL CAD: CPT | Mod: TC,PO

## 2021-05-31 ENCOUNTER — HOSPITAL ENCOUNTER (OUTPATIENT)
Dept: RADIOLOGY | Facility: HOSPITAL | Age: 51
Discharge: HOME | End: 2021-05-31
Attending: NURSE PRACTITIONER
Payer: MEDICAID

## 2021-05-31 DIAGNOSIS — R92.8 OTH ABN AND INCONCLUSIVE FINDINGS ON DX IMAGING OF BREAST: ICD-10-CM

## 2021-05-31 PROCEDURE — 77061 BREAST TOMOSYNTHESIS UNI: CPT | Mod: TC,PO,LT

## 2021-05-31 PROCEDURE — 76642 ULTRASOUND BREAST LIMITED: CPT | Mod: TC,PO,LT

## 2021-09-22 ENCOUNTER — OFFICE VISIT (OUTPATIENT)
Dept: SURGERY | Facility: CLINIC | Age: 51
End: 2021-09-22
Payer: MEDICAID

## 2021-09-22 VITALS
HEIGHT: 61 IN | SYSTOLIC BLOOD PRESSURE: 118 MMHG | HEART RATE: 86 BPM | WEIGHT: 138.69 LBS | DIASTOLIC BLOOD PRESSURE: 83 MMHG | BODY MASS INDEX: 26.19 KG/M2

## 2021-09-22 DIAGNOSIS — K64.0 GRADE I INTERNAL HEMORRHOIDS: Primary | ICD-10-CM

## 2021-09-22 PROCEDURE — 99999 PR PBB SHADOW E&M-EST. PATIENT-LVL III: CPT | Mod: PBBFAC,,, | Performed by: NURSE PRACTITIONER

## 2021-09-22 PROCEDURE — 99213 OFFICE O/P EST LOW 20 MIN: CPT | Mod: PBBFAC | Performed by: NURSE PRACTITIONER

## 2021-09-22 PROCEDURE — 46600 DIAGNOSTIC ANOSCOPY SPX: CPT | Mod: PBBFAC | Performed by: NURSE PRACTITIONER

## 2021-09-22 PROCEDURE — 99204 PR OFFICE/OUTPT VISIT, NEW, LEVL IV, 45-59 MIN: ICD-10-PCS | Mod: 25,S$PBB,, | Performed by: NURSE PRACTITIONER

## 2021-09-22 PROCEDURE — 46600 PR DIAG2STIC A2SCOPY: ICD-10-PCS | Mod: S$PBB,,, | Performed by: NURSE PRACTITIONER

## 2021-09-22 PROCEDURE — 99999 PR PBB SHADOW E&M-EST. PATIENT-LVL III: ICD-10-PCS | Mod: PBBFAC,,, | Performed by: NURSE PRACTITIONER

## 2021-09-22 PROCEDURE — 46600 DIAGNOSTIC ANOSCOPY SPX: CPT | Mod: S$PBB,,, | Performed by: NURSE PRACTITIONER

## 2021-09-22 PROCEDURE — 99204 OFFICE O/P NEW MOD 45 MIN: CPT | Mod: 25,S$PBB,, | Performed by: NURSE PRACTITIONER

## 2021-09-22 RX ORDER — HYDROCORTISONE ACETATE 25 MG/1
25 SUPPOSITORY RECTAL 2 TIMES DAILY
Qty: 20 SUPPOSITORY | Refills: 0 | Status: SHIPPED | OUTPATIENT
Start: 2021-09-22 | End: 2021-10-02

## 2022-04-28 DIAGNOSIS — Z12.31 ENCOUNTER FOR SCREENING MAMMOGRAM FOR MALIGNANT NEOPLASM OF BREAST: Primary | ICD-10-CM

## 2023-01-21 NOTE — LETTER
December 18, 2017      ABE Gonzales  [address] [place] [id number]           [facility]  [address] [place] [address]  Phone: [phone]          Patient: Sonia Hicks   MR Number: [id number]   YOB: 1970   Date of Visit: 12/18/2017       Dear Antonietta Chi:    Thank you for referring Sonia Hicks to me for evaluation. Attached you will find relevant portions of my assessment and plan of care.    If you have questions, please do not hesitate to call me. I look forward to following Sonia Hicks along with you.    Sincerely,    Jacquelyn Radford MD    Enclosure  CC:  No Recipients    If you would like to receive this communication electronically, please contact [email or web address] or [phone] to request more information on [facility] access.    For providers and/or their staff who would like to refer a patient to [facility], please contact us through our one-stop-shop provider referral line, [facility], at [phone].    If you feel you have received this communication in error or would no longer like to receive these types of communications, please e-mail [email or web address]         
1.71

## 2023-11-07 DIAGNOSIS — J45.21 MILD INTERMITTENT ASTHMA WITH EXACERBATION: Primary | ICD-10-CM

## 2023-11-20 ENCOUNTER — HOSPITAL ENCOUNTER (EMERGENCY)
Facility: HOSPITAL | Age: 53
Discharge: HOME | End: 2023-11-20
Attending: EMERGENCY MEDICINE
Payer: MEDICAID

## 2023-11-20 VITALS
WEIGHT: 130 LBS | TEMPERATURE: 98 F | DIASTOLIC BLOOD PRESSURE: 70 MMHG | OXYGEN SATURATION: 98 % | SYSTOLIC BLOOD PRESSURE: 102 MMHG | HEIGHT: 61 IN | HEART RATE: 92 BPM | RESPIRATION RATE: 16 BRPM | BODY MASS INDEX: 24.55 KG/M2

## 2023-11-20 DIAGNOSIS — R05.9 COUGH: ICD-10-CM

## 2023-11-20 DIAGNOSIS — F17.200 SMOKING: Primary | ICD-10-CM

## 2023-11-20 LAB
INFLUENZA A, MOLECULAR: NEGATIVE
INFLUENZA B, MOLECULAR: NEGATIVE
SARS-COV-2 RDRP RESP QL NAA+PROBE: NEGATIVE
SPECIMEN SOURCE: NORMAL

## 2023-11-20 PROCEDURE — 25000003 PHARM REV CODE 250: Mod: ER | Performed by: EMERGENCY MEDICINE

## 2023-11-20 PROCEDURE — U0002 COVID-19 LAB TEST NON-CDC: HCPCS | Mod: ER | Performed by: EMERGENCY MEDICINE

## 2023-11-20 PROCEDURE — 25000242 PHARM REV CODE 250 ALT 637 W/ HCPCS: Mod: ER | Performed by: EMERGENCY MEDICINE

## 2023-11-20 PROCEDURE — 94640 AIRWAY INHALATION TREATMENT: CPT | Mod: ER

## 2023-11-20 PROCEDURE — 87502 INFLUENZA DNA AMP PROBE: CPT | Mod: ER | Performed by: EMERGENCY MEDICINE

## 2023-11-20 PROCEDURE — 99284 EMERGENCY DEPT VISIT MOD MDM: CPT | Mod: 25,ER

## 2023-11-20 PROCEDURE — 63600175 PHARM REV CODE 636 W HCPCS: Mod: ER | Performed by: EMERGENCY MEDICINE

## 2023-11-20 RX ORDER — BENZONATATE 100 MG/1
200 CAPSULE ORAL
Status: COMPLETED | OUTPATIENT
Start: 2023-11-20 | End: 2023-11-20

## 2023-11-20 RX ORDER — PREDNISONE 20 MG/1
60 TABLET ORAL DAILY
Qty: 21 TABLET | Refills: 0 | Status: SHIPPED | OUTPATIENT
Start: 2023-11-20 | End: 2023-11-27

## 2023-11-20 RX ORDER — PREDNISONE 20 MG/1
60 TABLET ORAL
Status: COMPLETED | OUTPATIENT
Start: 2023-11-20 | End: 2023-11-20

## 2023-11-20 RX ORDER — BENZONATATE 200 MG/1
200 CAPSULE ORAL 3 TIMES DAILY PRN
Qty: 20 CAPSULE | Refills: 0 | Status: SHIPPED | OUTPATIENT
Start: 2023-11-20 | End: 2023-11-30

## 2023-11-20 RX ORDER — AZITHROMYCIN 250 MG/1
250 TABLET, FILM COATED ORAL DAILY
Qty: 6 TABLET | Refills: 0 | Status: SHIPPED | OUTPATIENT
Start: 2023-11-20

## 2023-11-20 RX ORDER — ALBUTEROL SULFATE 90 UG/1
2 AEROSOL, METERED RESPIRATORY (INHALATION)
Status: COMPLETED | OUTPATIENT
Start: 2023-11-20 | End: 2023-11-20

## 2023-11-20 RX ADMIN — ALBUTEROL SULFATE 2 PUFF: 90 AEROSOL, METERED RESPIRATORY (INHALATION) at 10:11

## 2023-11-20 RX ADMIN — PREDNISONE 60 MG: 20 TABLET ORAL at 10:11

## 2023-11-20 RX ADMIN — BENZONATATE 200 MG: 100 CAPSULE ORAL at 10:11

## 2023-11-20 NOTE — LETTER
November 20, 2023    Sonia Hicks  [address] [place], [place] [id number]             [facility] Dept  [address] [facility] [address]  Phone: [phone]  Fax: [phone] Dear {//[id number]/:[id number]} :    ***      If you have any questions or concerns, please don't hesitate to call.    Sincerely,        Bee Childers RN

## 2023-11-21 NOTE — ED PROVIDER NOTES
"Encounter Date: 2023       History     Chief Complaint   Patient presents with    Cough     Pt to the ER states she has a cough "for a long time" and coughing up "mucus sometime." Pt reports "fever sometimes." Pt reports she has seen [facility] for cough and given steroids and increase inhaler use.      HPI  53 y.o.   Chronic cough, sts worsening, sometimes productive  Smokes  Not on ace  No f/c/st  No leg pain nor swelling    Review of patient's allergies indicates:  No Known Allergies  Past Medical History:   Diagnosis Date    High cholesterol     Hypertension     Ovarian cyst      Past Surgical History:   Procedure Laterality Date    ABDOMINAL SURGERY      COLONOSCOPY N/A 2018    Procedure: COLONOSCOPY Golytely;  Surgeon: Nina Padilla MD;  Location: [facility];  Service: Endoscopy;  Laterality: N/A;    oophorectomy       Family History   Problem Relation Age of Onset    Hypertension Mother     Diabetes Mother     Heart disease Mother          of MI    Liver disease Mother         failure    Heart disease Sister     Hypertension Brother     Diabetes Brother     Heart disease Sister      Social History     Tobacco Use    Smoking status: Every Day     Current packs/day: 0.50     Average packs/day: 0.5 packs/day for 30.0 years (15.0 ttl pk-yrs)     Types: Cigarettes    Smokeless tobacco: Never   Substance Use Topics    Alcohol use: Yes     Comment: socailly    Drug use: Not Currently     Types: Marijuana     Comment: former THC     Review of Systems  All systems were reviewed/examined and were negative except as noted in the HPI.    Physical Exam     Initial Vitals [23 2219]   BP Pulse Resp Temp SpO2   122/76 87 18 98.2 °F (36.8 °C) 97 %      MAP       --         Physical Exam    General: the patient is awake, alert, and in no apparent distress.  Head: normocephalic and atraumatic, sclera are clear  Neck: supple without meningismus  Chest: clear to auscultation bilaterally, no " respiratory distress  Heart: regular rate and rhythm  ABD soft, nontender, nondistended, no peritoneal signs  No calf t no edema  Extremities: warm and well perfused  Skin: warm and dry  Psych conversant  Neuro: awake, alert, moving all extremities    ED Course   Procedures  Labs Reviewed   INFLUENZA A & B BY MOLECULAR   SARS-COV-2 RNA AMPLIFICATION, QUAL    Narrative:     Is the patient symptomatic?->Yes          Imaging Results              X-Ray Chest AP Portable (Final result)  Result time 11/20/23 22:53:37      Final result by Yang Brown MD (11/20/23 22:53:37)                   Impression:      Slight lingular scarring.  No definite acute process seen      Electronically signed by: Yang Brown  Date:    11/20/2023  Time:    22:53               Narrative:    EXAMINATION:  XR CHEST AP PORTABLE    CLINICAL HISTORY:  Cough, unspecified    TECHNIQUE:  Single frontal view of the chest was performed.    COMPARISON:  None    FINDINGS:  Slight lingular atelectasis.  Otherwisethe lungs are clear, with normal appearance of pulmonary vasculature and no pleural effusion or pneumothorax.    The cardiac silhouette is normal in size. The hilar and mediastinal contours are unremarkable.    Bones are intact.                                       Medications   benzonatate capsule 200 mg (200 mg Oral Given 11/20/23 2231)   predniSONE tablet 60 mg (60 mg Oral Given 11/20/23 2231)   albuterol inhaler 2 puff (2 puffs Inhalation Given 11/20/23 2241)     Medical Decision Making  Amount and/or Complexity of Data Reviewed  Radiology: ordered.    Risk  Prescription drug management.       Medical Decision Making:    This is an emergent evaluation of a patient presenting to the ED.  Nursing notes were reviewed.    DDX: not on ACE, neg CXR (no pneumonia, lung mass), swabs neg for covid, flu    Imaging reviewed by me (chest x-ray), personally and independently, and prelims if available.  No acute/emergent pathologic findings noted on  radiologic studies ordered.    I reviewed radiology images personally along with interpretations.  I personally reviewed and interpreted the laboratory results.  Swabs neg  I decided to obtain and review old medical records, which showed: well care    Evaluation for Emergency Medical Condition  The patient received a medical screening exam and within a reasonable degree of clinical confidence an emergency medical condition has not been identified.  The patient is instructed on proper follow up and return precautions to the ED.    The patient was encouraged and counseled to quit smoking and use of tobacco products, including the effect on their health.  Smoking cessation resources were provided.    Sx tx      Junior Godinez MD, KATHIE                              Clinical Impression:  Final diagnoses:  [R05.9] Cough  [F17.200] Smoking (Primary)          ED Disposition Condition    Discharge Stable          ED Prescriptions       Medication Sig Dispense Start Date End Date Auth. Provider    azithromycin (Z-CARMELO) 250 MG tablet Take 1 tablet (250 mg total) by mouth once daily. Take first 2 tablets together, then 1 every day until finished. 6 tablet 11/20/2023 -- Roberto Godinez MD    predniSONE (DELTASONE) 20 MG tablet Take 3 tablets (60 mg total) by mouth once daily. for 7 days 21 tablet 11/20/2023 11/27/2023 Roberto Godinez MD    benzonatate (TESSALON) 200 MG capsule Take 1 capsule (200 mg total) by mouth 3 (three) times daily as needed for Cough. 20 capsule 11/20/2023 11/30/2023 Roberto Godinez MD          Follow-up Information       Follow up With Specialties Details Why Contact Antonietta Huerta FNP Family Medicine Schedule an appointment as soon as possible for a visit   [address] [place] [id number] [phone]            Discharged to home in stable condition, return to ED warnings given, follow up and patient care instructions given.      Junior Godinez MD,  KATHIE, MANA  Department of Emergency Medicine       Roberto Godinez MD  11/21/23 9400

## 2023-12-12 ENCOUNTER — TELEPHONE (OUTPATIENT)
Dept: PULMONOLOGY | Facility: CLINIC | Age: 53
End: 2023-12-12
Payer: MEDICAID

## 2023-12-12 DIAGNOSIS — J45.909 ASTHMA, UNSPECIFIED ASTHMA SEVERITY, UNSPECIFIED WHETHER COMPLICATED, UNSPECIFIED WHETHER PERSISTENT: Primary | ICD-10-CM

## 2023-12-12 NOTE — TELEPHONE ENCOUNTER
----- Message from Mamta Stafford sent at 12/12/2023  9:07 AM CST -----  Regarding: Referral  Good morning,       I received a referral on behalf of the patient attached, from Antonietta Chi, with a diagnosis of mild intermittent asthma with exacerbation and lung abnormality.  I have scanned the referral and notes into media mgr.  Please review and contact the patient to schedule or to advise.  I attempted to schedule but no appointments populated.  Not sure if it was due to her having Medicaid.  Please advise if so.        Thank you,        Mamta Stafford  [facility]

## 2023-12-12 NOTE — TELEPHONE ENCOUNTER
Spoke with pt, informed her that I have received her message. I also advised pt hat I can schedule her appointment with Dr Danielson on 1/5/2024 for 1:00 pm with Pulmonary Function Test beginning at 12:00 pm. Pt verbalized that she understand and accepted the appointment.

## 2024-01-05 ENCOUNTER — OFFICE VISIT (OUTPATIENT)
Dept: PULMONOLOGY | Facility: CLINIC | Age: 54
End: 2024-01-05
Payer: MEDICAID

## 2024-01-05 ENCOUNTER — HOSPITAL ENCOUNTER (OUTPATIENT)
Dept: PULMONOLOGY | Facility: CLINIC | Age: 54
Discharge: HOME | End: 2024-01-05
Payer: MEDICAID

## 2024-01-05 VITALS
HEART RATE: 69 BPM | OXYGEN SATURATION: 100 % | DIASTOLIC BLOOD PRESSURE: 84 MMHG | SYSTOLIC BLOOD PRESSURE: 134 MMHG | HEIGHT: 62 IN | BODY MASS INDEX: 24.42 KG/M2 | WEIGHT: 132.69 LBS

## 2024-01-05 DIAGNOSIS — J45.909 ASTHMA, UNSPECIFIED ASTHMA SEVERITY, UNSPECIFIED WHETHER COMPLICATED, UNSPECIFIED WHETHER PERSISTENT: ICD-10-CM

## 2024-01-05 DIAGNOSIS — R06.02 SHORTNESS OF BREATH: ICD-10-CM

## 2024-01-05 DIAGNOSIS — R05.3 CHRONIC COUGH: Primary | ICD-10-CM

## 2024-01-05 DIAGNOSIS — K21.9 GASTROESOPHAGEAL REFLUX DISEASE, UNSPECIFIED WHETHER ESOPHAGITIS PRESENT: ICD-10-CM

## 2024-01-05 DIAGNOSIS — R05.9 COUGH, UNSPECIFIED TYPE: ICD-10-CM

## 2024-01-05 DIAGNOSIS — F17.200 SMOKING: ICD-10-CM

## 2024-01-05 PROCEDURE — 94060 EVALUATION OF WHEEZING: CPT | Mod: PBBFAC | Performed by: INTERNAL MEDICINE

## 2024-01-05 PROCEDURE — 3075F SYST BP GE 130 - 139MM HG: CPT | Mod: CPTII,,, | Performed by: INTERNAL MEDICINE

## 2024-01-05 PROCEDURE — 3079F DIAST BP 80-89 MM HG: CPT | Mod: CPTII,,, | Performed by: INTERNAL MEDICINE

## 2024-01-05 PROCEDURE — 1159F MED LIST DOCD IN RCRD: CPT | Mod: CPTII,,, | Performed by: INTERNAL MEDICINE

## 2024-01-05 PROCEDURE — 99203 OFFICE O/P NEW LOW 30 MIN: CPT | Mod: 25,S$PBB,, | Performed by: INTERNAL MEDICINE

## 2024-01-05 PROCEDURE — 94727 GAS DIL/WSHOT DETER LNG VOL: CPT | Mod: PBBFAC | Performed by: INTERNAL MEDICINE

## 2024-01-05 PROCEDURE — 99999 PR PBB SHADOW E&M-EST. PATIENT-LVL III: CPT | Mod: PBBFAC,,, | Performed by: STUDENT IN AN ORGANIZED HEALTH CARE EDUCATION/TRAINING PROGRAM

## 2024-01-05 PROCEDURE — 3008F BODY MASS INDEX DOCD: CPT | Mod: CPTII,,, | Performed by: INTERNAL MEDICINE

## 2024-01-05 PROCEDURE — 99213 OFFICE O/P EST LOW 20 MIN: CPT | Mod: PBBFAC,25 | Performed by: STUDENT IN AN ORGANIZED HEALTH CARE EDUCATION/TRAINING PROGRAM

## 2024-01-05 PROCEDURE — 94729 DIFFUSING CAPACITY: CPT | Mod: PBBFAC | Performed by: INTERNAL MEDICINE

## 2024-01-05 RX ORDER — PANTOPRAZOLE SODIUM 40 MG/1
40 TABLET, DELAYED RELEASE ORAL DAILY
Qty: 60 TABLET | Refills: 1 | Status: SHIPPED | OUTPATIENT
Start: 2024-01-05 | End: 2025-01-04
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
## 2024-01-09 PROCEDURE — 94060 EVALUATION OF WHEEZING: CPT | Mod: 26,S$PBB,, | Performed by: INTERNAL MEDICINE

## 2024-01-09 PROCEDURE — 94727 GAS DIL/WSHOT DETER LNG VOL: CPT | Mod: 26,S$PBB,, | Performed by: INTERNAL MEDICINE

## 2024-01-09 PROCEDURE — 94729 DIFFUSING CAPACITY: CPT | Mod: 26,S$PBB,, | Performed by: INTERNAL MEDICINE

## 2024-01-18 ENCOUNTER — TELEPHONE (OUTPATIENT)
Dept: PULMONOLOGY | Facility: CLINIC | Age: 54
End: 2024-01-18
Payer: MEDICAID

## 2024-01-18 NOTE — TELEPHONE ENCOUNTER
LVM for patient to return my call in regards to scheduling follow up visit with labs prior. Office number was provided.

## 2024-01-18 NOTE — TELEPHONE ENCOUNTER
----- Message from Thelma Echols sent at 1/18/2024  3:47 PM CST -----  Regarding: appt  Contact: @ [phone]  Pt is returning a missed call from [employer] ... in regards to making a follow up appointment ...Please call and adv @ [phone]

## 2024-03-01 ENCOUNTER — HOSPITAL ENCOUNTER (OUTPATIENT)
Dept: CARDIOLOGY | Facility: HOSPITAL | Age: 54
Discharge: HOME | End: 2024-03-01
Attending: STUDENT IN AN ORGANIZED HEALTH CARE EDUCATION/TRAINING PROGRAM
Payer: MEDICAID

## 2024-03-01 VITALS
WEIGHT: 132 LBS | BODY MASS INDEX: 24.29 KG/M2 | SYSTOLIC BLOOD PRESSURE: 128 MMHG | HEART RATE: 74 BPM | HEIGHT: 62 IN | DIASTOLIC BLOOD PRESSURE: 80 MMHG

## 2024-03-01 DIAGNOSIS — R06.02 SHORTNESS OF BREATH: ICD-10-CM

## 2024-03-01 LAB
ASCENDING AORTA: 3.01 CM
AV INDEX (PROSTH): 0.97
AV MEAN GRADIENT: 4 MMHG
AV PEAK GRADIENT: 9 MMHG
AV VALVE AREA BY VELOCITY RATIO: 2.65 CM²
AV VALVE AREA: 2.85 CM²
AV VELOCITY RATIO: 0.91
BSA FOR ECHO PROCEDURE: 1.62 M2
CV ECHO LV RWT: 0.41 CM
DOP CALC AO PEAK VEL: 1.51 M/S
DOP CALC AO VTI: 25.54 CM
DOP CALC LVOT AREA: 2.9 CM2
DOP CALC LVOT DIAMETER: 1.93 CM
DOP CALC LVOT PEAK VEL: 1.37 M/S
DOP CALC LVOT STROKE VOLUME: 72.66 CM3
DOP CALCLVOT PEAK VEL VTI: 24.85 CM
E WAVE DECELERATION TIME: 193.14 MSEC
E/A RATIO: 1.28
E/E' RATIO: 7.8 M/S
ECHO LV POSTERIOR WALL: 0.8 CM (ref 0.6–1.1)
FRACTIONAL SHORTENING: 47 % (ref 28–44)
INTERVENTRICULAR SEPTUM: 0.63 CM (ref 0.6–1.1)
IVRT: 99.9 MSEC
LA MAJOR: 4.97 CM
LA MINOR: 4.89 CM
LA WIDTH: 3.65 CM
LEFT ATRIUM SIZE: 2.81 CM
LEFT ATRIUM VOLUME INDEX MOD: 22.4 ML/M2
LEFT ATRIUM VOLUME INDEX: 26.9 ML/M2
LEFT ATRIUM VOLUME MOD: 35.79 CM3
LEFT ATRIUM VOLUME: 42.98 CM3
LEFT INTERNAL DIMENSION IN SYSTOLE: 2.09 CM (ref 2.1–4)
LEFT VENTRICLE DIASTOLIC VOLUME INDEX: 42.39 ML/M2
LEFT VENTRICLE DIASTOLIC VOLUME: 67.82 ML
LEFT VENTRICLE MASS INDEX: 49 G/M2
LEFT VENTRICLE SYSTOLIC VOLUME INDEX: 8.9 ML/M2
LEFT VENTRICLE SYSTOLIC VOLUME: 14.26 ML
LEFT VENTRICULAR INTERNAL DIMENSION IN DIASTOLE: 3.95 CM (ref 3.5–6)
LEFT VENTRICULAR MASS: 78.88 G
LV LATERAL E/E' RATIO: 7.8 M/S
LV SEPTAL E/E' RATIO: 7.8 M/S
MV A" WAVE DURATION": 6.85 MSEC
MV PEAK A VEL: 0.61 M/S
MV PEAK E VEL: 0.78 M/S
MV STENOSIS PRESSURE HALF TIME: 56.01 MS
MV VALVE AREA P 1/2 METHOD: 3.93 CM2
PISA TR MAX VEL: 2.1 M/S
PULM VEIN S/D RATIO: 1.68
PV PEAK D VEL: 0.44 M/S
PV PEAK S VEL: 0.74 M/S
RA MAJOR: 4.29 CM
RA PRESSURE ESTIMATED: 3 MMHG
RA WIDTH: 3.67 CM
RIGHT VENTRICULAR END-DIASTOLIC DIMENSION: 2.9 CM
RV TB RVSP: 5 MMHG
SINUS: 2.55 CM
STJ: 2.2 CM
TDI LATERAL: 0.1 M/S
TDI SEPTAL: 0.1 M/S
TDI: 0.1 M/S
TR MAX PG: 18 MMHG
TRICUSPID ANNULAR PLANE SYSTOLIC EXCURSION: 2.14 CM
TV REST PULMONARY ARTERY PRESSURE: 21 MMHG
Z-SCORE OF LEFT VENTRICULAR DIMENSION IN END DIASTOLE: -1.42
Z-SCORE OF LEFT VENTRICULAR DIMENSION IN END SYSTOLE: -2.38

## 2024-03-01 PROCEDURE — 93306 TTE W/DOPPLER COMPLETE: CPT | Mod: 26,,, | Performed by: INTERNAL MEDICINE

## 2024-03-01 PROCEDURE — 93306 TTE W/DOPPLER COMPLETE: CPT

## 2024-04-26 ENCOUNTER — HOSPITAL ENCOUNTER (OUTPATIENT)
Dept: PULMONOLOGY | Facility: CLINIC | Age: 54
Discharge: HOME | End: 2024-04-26
Payer: MEDICAID

## 2024-04-26 ENCOUNTER — HOSPITAL ENCOUNTER (OUTPATIENT)
Dept: RADIOLOGY | Facility: HOSPITAL | Age: 54
Discharge: HOME | End: 2024-04-26
Attending: STUDENT IN AN ORGANIZED HEALTH CARE EDUCATION/TRAINING PROGRAM
Payer: MEDICAID

## 2024-04-26 ENCOUNTER — OFFICE VISIT (OUTPATIENT)
Dept: PULMONOLOGY | Facility: CLINIC | Age: 54
End: 2024-04-26
Payer: MEDICAID

## 2024-04-26 VITALS
BODY MASS INDEX: 25.14 KG/M2 | DIASTOLIC BLOOD PRESSURE: 80 MMHG | WEIGHT: 133.19 LBS | WEIGHT: 130 LBS | BODY MASS INDEX: 24.55 KG/M2 | HEIGHT: 61 IN | SYSTOLIC BLOOD PRESSURE: 116 MMHG | OXYGEN SATURATION: 98 % | HEIGHT: 61 IN | HEART RATE: 76 BPM

## 2024-04-26 DIAGNOSIS — R05.9 COUGH, UNSPECIFIED TYPE: ICD-10-CM

## 2024-04-26 DIAGNOSIS — K92.1 HEMATOCHEZIA: ICD-10-CM

## 2024-04-26 DIAGNOSIS — R06.02 SHORTNESS OF BREATH: ICD-10-CM

## 2024-04-26 DIAGNOSIS — K21.9 GASTROESOPHAGEAL REFLUX DISEASE, UNSPECIFIED WHETHER ESOPHAGITIS PRESENT: ICD-10-CM

## 2024-04-26 DIAGNOSIS — R05.9 COUGH, UNSPECIFIED TYPE: Primary | ICD-10-CM

## 2024-04-26 PROCEDURE — 1159F MED LIST DOCD IN RCRD: CPT | Mod: CPTII,,, | Performed by: STUDENT IN AN ORGANIZED HEALTH CARE EDUCATION/TRAINING PROGRAM

## 2024-04-26 PROCEDURE — 99213 OFFICE O/P EST LOW 20 MIN: CPT | Mod: PBBFAC | Performed by: STUDENT IN AN ORGANIZED HEALTH CARE EDUCATION/TRAINING PROGRAM

## 2024-04-26 PROCEDURE — 71250 CT THORAX DX C-: CPT | Mod: TC

## 2024-04-26 PROCEDURE — 3008F BODY MASS INDEX DOCD: CPT | Mod: CPTII,,, | Performed by: STUDENT IN AN ORGANIZED HEALTH CARE EDUCATION/TRAINING PROGRAM

## 2024-04-26 PROCEDURE — 99499 UNLISTED E&M SERVICE: CPT | Mod: S$PBB,,, | Performed by: STUDENT IN AN ORGANIZED HEALTH CARE EDUCATION/TRAINING PROGRAM

## 2024-04-26 PROCEDURE — 99999 PR PBB SHADOW E&M-EST. PATIENT-LVL III: CPT | Mod: PBBFAC,,, | Performed by: STUDENT IN AN ORGANIZED HEALTH CARE EDUCATION/TRAINING PROGRAM

## 2024-04-26 PROCEDURE — 94618 PULMONARY STRESS TESTING: CPT | Mod: 26,S$PBB,, | Performed by: INTERNAL MEDICINE

## 2024-04-26 PROCEDURE — 3074F SYST BP LT 130 MM HG: CPT | Mod: CPTII,,, | Performed by: STUDENT IN AN ORGANIZED HEALTH CARE EDUCATION/TRAINING PROGRAM

## 2024-04-26 PROCEDURE — 3079F DIAST BP 80-89 MM HG: CPT | Mod: CPTII,,, | Performed by: STUDENT IN AN ORGANIZED HEALTH CARE EDUCATION/TRAINING PROGRAM

## 2024-04-26 PROCEDURE — 71250 CT THORAX DX C-: CPT | Mod: 26,,, | Performed by: RADIOLOGY

## 2024-04-26 PROCEDURE — 94618 PULMONARY STRESS TESTING: CPT | Mod: PBBFAC | Performed by: INTERNAL MEDICINE
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
## 2024-04-26 NOTE — PROCEDURES
Sonia Hicks is a 53 y.o.  female patient, who presents for a 6 minute walk test ordered by MD Erum.  The diagnosis is Shortness of Breath.  The patient's BMI is 24.6 kg/m2.  Predicted distance (lower limit of normal) is 409.68 meters.      Test Results:    The test was completed without stopping.  The total time walked was 360 seconds.  During walking, the patient reported:  Dyspnea, Other (Comment) (chest burning; tight legs). The patient used no assistive devices during testing.     04/26/2024---------Distance: 487.68 meters (1600 feet)     O2 Sat % Supplemental Oxygen Heart Rate Blood Pressure Abdiel Scale   Pre-exercise  (Resting) 98 % Room Air 76 bpm 116/80 mmHg 2   During Exercise 98 % Room Air 105 bpm 116/79 mmHg 4   Post-exercise  (Recovery) 98 % Room Air  89 bpm       Recovery Time: 74 seconds    Performing nurse/tech: Keyshawn COOPER      PREVIOUS STUDY:   The patient has not had a previous study.      CLINICAL INTERPRETATION:  Six minute walk distance is 487.68 meters (1600 feet) with somewhat heavy dyspnea.  During exercise, there was no desaturation while breathing room air.  Blood pressure remained stable and Heart rate increased significantly with walking.  The patient reported non-pulmonary symptoms during exercise.  No previous study performed.  Based upon age and body mass index, exercise capacity is normal.

## 2024-06-21 ENCOUNTER — OFFICE VISIT (OUTPATIENT)
Dept: PULMONOLOGY | Facility: CLINIC | Age: 54
End: 2024-06-21
Payer: MEDICAID

## 2024-06-21 VITALS
WEIGHT: 131.38 LBS | HEIGHT: 61 IN | HEART RATE: 103 BPM | BODY MASS INDEX: 24.8 KG/M2 | SYSTOLIC BLOOD PRESSURE: 128 MMHG | DIASTOLIC BLOOD PRESSURE: 82 MMHG | OXYGEN SATURATION: 98 %

## 2024-06-21 DIAGNOSIS — E01.0 THYROMEGALY: ICD-10-CM

## 2024-06-21 DIAGNOSIS — K21.9 GASTROESOPHAGEAL REFLUX DISEASE, UNSPECIFIED WHETHER ESOPHAGITIS PRESENT: ICD-10-CM

## 2024-06-21 DIAGNOSIS — R05.9 COUGH, UNSPECIFIED TYPE: Primary | ICD-10-CM

## 2024-06-21 PROCEDURE — 99213 OFFICE O/P EST LOW 20 MIN: CPT | Mod: PBBFAC | Performed by: STUDENT IN AN ORGANIZED HEALTH CARE EDUCATION/TRAINING PROGRAM

## 2024-06-21 PROCEDURE — 99999 PR PBB SHADOW E&M-EST. PATIENT-LVL III: CPT | Mod: PBBFAC,,, | Performed by: STUDENT IN AN ORGANIZED HEALTH CARE EDUCATION/TRAINING PROGRAM
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
## 2024-06-21 NOTE — PROGRESS NOTES
53 year old woman who presents for follow up.  She was initially seen with concern for chronic cough and possibly asthma.   PFTs show mild restriction, no obstruction, no BDR, and mildly reduced DLCO  CT Chest from 4/23/24 reviewed.  Images are largely unremarkable save for a prominent thyroid.   Patient reports persistent cough, mostly at night when lying down. Suspect persistent LPR despite PPI.  She reports new dysphagia.     Needs annual CT Chest as part of a lung cancer screening regimen.  US thyroid  GI follow up regarding persistent GERD symptoms despite PPI and dysphagia  RTC after GI evaluation if symptoms persist.

## 2024-07-01 ENCOUNTER — TELEPHONE (OUTPATIENT)
Dept: GASTROENTEROLOGY | Facility: CLINIC | Age: 54
End: 2024-07-01

## 2024-07-01 ENCOUNTER — OFFICE VISIT (OUTPATIENT)
Dept: GASTROENTEROLOGY | Facility: CLINIC | Age: 54
End: 2024-07-01
Payer: MEDICAID

## 2024-07-01 VITALS
DIASTOLIC BLOOD PRESSURE: 92 MMHG | WEIGHT: 128.44 LBS | SYSTOLIC BLOOD PRESSURE: 131 MMHG | HEART RATE: 72 BPM | BODY MASS INDEX: 24.25 KG/M2 | HEIGHT: 61 IN

## 2024-07-01 DIAGNOSIS — K92.1 HEMATOCHEZIA: ICD-10-CM

## 2024-07-01 DIAGNOSIS — K21.9 GASTROESOPHAGEAL REFLUX DISEASE, UNSPECIFIED WHETHER ESOPHAGITIS PRESENT: Primary | ICD-10-CM

## 2024-07-01 DIAGNOSIS — R10.13 EPIGASTRIC PAIN: ICD-10-CM

## 2024-07-01 DIAGNOSIS — R10.13 EPIGASTRIC PAIN: Primary | ICD-10-CM

## 2024-07-01 DIAGNOSIS — F17.210 CIGARETTE SMOKER: ICD-10-CM

## 2024-07-01 DIAGNOSIS — K64.8 INTERNAL HEMORRHOIDS: ICD-10-CM

## 2024-07-01 PROCEDURE — 3075F SYST BP GE 130 - 139MM HG: CPT | Mod: CPTII,,, | Performed by: NURSE PRACTITIONER

## 2024-07-01 PROCEDURE — 3008F BODY MASS INDEX DOCD: CPT | Mod: CPTII,,, | Performed by: NURSE PRACTITIONER

## 2024-07-01 PROCEDURE — 99215 OFFICE O/P EST HI 40 MIN: CPT | Mod: PBBFAC,PN | Performed by: NURSE PRACTITIONER

## 2024-07-01 PROCEDURE — 3080F DIAST BP >= 90 MM HG: CPT | Mod: CPTII,,, | Performed by: NURSE PRACTITIONER

## 2024-07-01 PROCEDURE — 1160F RVW MEDS BY RX/DR IN RCRD: CPT | Mod: CPTII,,, | Performed by: NURSE PRACTITIONER

## 2024-07-01 PROCEDURE — 99999 PR PBB SHADOW E&M-EST. PATIENT-LVL V: CPT | Mod: PBBFAC,,, | Performed by: NURSE PRACTITIONER

## 2024-07-01 PROCEDURE — 99204 OFFICE O/P NEW MOD 45 MIN: CPT | Mod: S$PBB,,, | Performed by: NURSE PRACTITIONER

## 2024-07-01 PROCEDURE — 1159F MED LIST DOCD IN RCRD: CPT | Mod: CPTII,,, | Performed by: NURSE PRACTITIONER

## 2024-07-01 RX ORDER — DICYCLOMINE HYDROCHLORIDE 20 MG/1
20 TABLET ORAL 3 TIMES DAILY PRN
Qty: 60 TABLET | Refills: 2 | Status: SHIPPED | OUTPATIENT
Start: 2024-07-01

## 2024-07-01 NOTE — PATIENT INSTRUCTIONS
EGD Prep Instructions    [facility] [address] [place]  [address]    You are scheduled for an EGD with Dr. Gifford on 8/9/2024 at [facility].  You will enter through the [facility] entrance and check in at Same Day Surgery.    Nothing to eat or drink after midnight before the procedure.  You MAY brush your teeth.    You MAY take your blood pressure, heart, and seizure medication on the morning of the procedure, with a SIP of water.  Hold ALL other medications until after the procedure.    You must have someone with you to DRIVE YOU HOME since you will be receiving IV sedation for the procedure.    If you are on blood thinners THAT YOU HAVE BEEN INSTRUCTED TO HOLD BY YOUR DOCTOR FOR THIS PROCEDURE, then do NOT take this the morning of your EGD.  Do NOT stop these medications on your own, they must be approved to be held by your doctor.  Your EGD can NOT be done if you are on these medications.  Examples of blood thinners include: Coumadin, Aggrenox, Plavix, Pradaxa, Reapro, Pletal, Xarelto, Ticagrelor, Brilinta, Eliquis, and high dose aspirin (325 mg).  You do not have to stop baby aspirin 81 mg.    You will receive a call the afternoon before your EGD to tell you the time to arrive.  If you have not received a call by the day before your procedure, call the Pre-op Coordinator at [phone].

## 2024-07-01 NOTE — TELEPHONE ENCOUNTER
Left message for patient to call the office regarding test results.    ----- Message from ABE Wagner sent at 7/1/2024  1:49 PM CDT -----  No significant stool burden noted on xray of abdomen. Recommend high fiber diet and liberal fluid intake. Dicyclomine 20 mg three times per day as needed for abdominal pain.     Referral coordinator will contact you to schedule appt with colorectal for hemorrhoids and rectal bleeding.

## 2024-07-01 NOTE — PROGRESS NOTES
Subjective:       Patient ID: Sonia Hicks is a 53 y.o. female.    Chief Complaint: Gastroesophageal Reflux    52 y/o female with HTN, HLD, and chronic cough referred by pulmonary of GERD. Last seen in GI clinic 2018 by CUAUHTEMOC Alex for reflux symptoms and constipation. Patient reports frequent heartburn, epigastric pain, and nausea after eating. No vomiting. Feels like food is stuck in her throat sometimes and has chronic cough. Symptoms improving with daily pantoprazole but she often forgets to take medication. Treated for H. Pylori in the past. EGD in 2018 significant for gastritis and duodenitis; biopsies HP (-). Intermittent BRBPR after BM. Does endorse straining and rectal pain. Has BM daily or EOD. Hx of IH noted on colonoscopy in 2018.        Endoscopy History  - 2018 EGD: Normal esophagus. Gastritis. Biopsied. Duodenitis. Gastric biopsies: Moderate stomach inflammation, hpylori negative.  - 2018 Colonoscopy: One 2 mm polyp in the descending colon, removed with a hot snare and removed with a cold biopsy forceps. Resected and retrieved. Two 3 to 5 mm polyps in the sigmoid colon, removed with a cold biopsy forceps. Resected and retrieved. Internal hemorrhoids. The examination was otherwise normal on direct and retroflexion views.     Past Medical History:   Diagnosis Date    High cholesterol     Hypertension     Ovarian cyst        Past Surgical History:   Procedure Laterality Date    ABDOMINAL SURGERY      COLONOSCOPY N/A 2018    Procedure: COLONOSCOPY Golytely;  Surgeon: Nina Padilla MD;  Location: [facility];  Service: Endoscopy;  Laterality: N/A;    oophorectomy         Family History   Problem Relation Name Age of Onset    Hypertension Mother      Diabetes Mother      Heart disease Mother           of MI    Liver disease Mother          failure    Heart disease Sister      Hypertension Brother      Diabetes Brother      Heart disease Sister         Social History     Socioeconomic  "History    Marital status: Single   Occupational History     Employer: [employer]    Tobacco Use    Smoking status: Every Day     Current packs/day: 0.50     Average packs/day: 0.5 packs/day for 30.0 years (15.0 ttl pk-yrs)     Types: Cigarettes    Smokeless tobacco: Never   Substance and Sexual Activity    Alcohol use: Yes     Comment: socailly    Drug use: Not Currently     Types: Marijuana     Comment: former THC       Review of Systems   Constitutional:  Negative for appetite change and unexpected weight change.   HENT:  Negative for trouble swallowing.    Respiratory:  Negative for shortness of breath.    Cardiovascular:  Negative for chest pain.   Gastrointestinal:  Positive for abdominal pain, blood in stool and nausea. Negative for constipation and diarrhea.   Hematological:  Negative for adenopathy. Does not bruise/bleed easily.   Psychiatric/Behavioral:  Negative for dysphoric mood.          Objective:     Vitals:    07/01/24 0941   BP: (!) 131/92   BP Location: Left arm   Patient Position: Sitting   BP Method: Medium (Automatic)   Pulse: 72   Weight: 58.3 kg (128 lb 6.7 oz)   Height: 5' 1" (1.549 m)          Physical Exam  Constitutional:       General: She is not in acute distress.     Appearance: Normal appearance.   HENT:      Head: Normocephalic.   Eyes:      Conjunctiva/sclera: Conjunctivae normal.   Pulmonary:      Effort: Pulmonary effort is normal. No respiratory distress.   Musculoskeletal:         General: Normal range of motion.      Cervical back: Normal range of motion.   Skin:     General: Skin is warm and dry.   Neurological:      Mental Status: She is alert and oriented to person, place, and time.   Psychiatric:         Mood and Affect: Mood normal.         Behavior: Behavior normal.               Assessment:         ICD-10-CM ICD-9-CM   1. Gastroesophageal reflux disease, unspecified whether esophagitis present  K21.9 530.81   2. Epigastric pain  R10.13 789.06   3. Hematochezia  K92.1 " 578.1   4. Internal hemorrhoids  K64.8 455.0       Plan:       Gastroesophageal reflux disease, unspecified whether esophagitis present; Epigastric pain  -     Ambulatory referral/consult to Gastroenterology  -     Case Request Endoscopy: EGD (ESOPHAGOGASTRODUODENOSCOPY)  -      Take pantoprazole 40 mg every morning before breakfast  - Discussed elimination of dietary triggers (fatty foods, caffeine, chocolate, spicy foods, food with high fat content, carbonated beverages, and peppermint.  - Raise the head of your bed by 6 to 8 inches - You can do this by putting blocks of wood or rubber under 2 legs of the bed or a foam wedge under the mattress.  - Avoid late meals - Lying down with a full stomach can make reflux worse. Try to plan meals for at least 2 to 3 hours before bedtime.  - Avoid tight clothing - Some people feel better if they wear comfortable clothing that does not squeeze the stomach area.     Hematochezia  -     Ambulatory referral/consult to Gastroenterology  -     Ambulatory referral/consult to Colorectal Surgery; Future; Expected date: 07/08/2024    Internal hemorrhoids  -     Ambulatory referral/consult to Colorectal Surgery; Future; Expected date: 07/08/2024    Cigarette smoker  -     Encouraged smoking cessation  -     Patient declined referral to smoking cessation program    Follow up if symptoms worsen or fail to improve.     Patient's Medications   New Prescriptions    No medications on file   Previous Medications    ARIPIPRAZOLE (ABILIFY) 5 MG TAB    Take 5 mg by mouth once daily.    CHLORZOXAZONE (PARAFON FORTE) 500 MG TAB    Take 500 mg by mouth 4 (four) times daily as needed.    CYPROHEPTADINE (PERIACTIN) 4 MG TABLET    Take 4 mg by mouth 3 (three) times daily as needed.    DEXMETHYLPHENIDATE HCL (FOCALIN ORAL)    Take by mouth.    DICLOFENAC SODIUM (VOLTAREN) 1 % GEL    Apply 2 g topically 4 (four) times daily.    DOCUSATE SODIUM (COLACE) 100 MG CAPSULE    Take 1 capsule (100 mg total) by  mouth 2 (two) times daily as needed for Constipation.    ESCITALOPRAM OXALATE (LEXAPRO) 10 MG TABLET    Take 10 mg by mouth once daily.    HYDROCODONE-ACETAMINOPHEN (NORCO) 7.5-325 MG PER TABLET    Take 1 tablet by mouth every 6 (six) hours as needed for Pain.    PANTOPRAZOLE (PROTONIX) 40 MG TABLET    Take 1 tablet (40 mg total) by mouth once daily.   Modified Medications    No medications on file   Discontinued Medications    AZITHROMYCIN (Z-CARMELO) 250 MG TABLET    Take 1 tablet (250 mg total) by mouth once daily. Take first 2 tablets together, then 1 every day until finished.    BUTALBITAL-ACETAMINOP-CAF-COD -03-30 MG CAP    Take 1 capsule by mouth every 4 (four) hours. As needed for pain    HYDROCORTISONE 2.5 % CREAM    Apply topically 2 (two) times daily.

## 2024-07-09 ENCOUNTER — TELEPHONE (OUTPATIENT)
Dept: GASTROENTEROLOGY | Facility: CLINIC | Age: 54
End: 2024-07-09
Payer: MEDICAID

## 2024-07-09 DIAGNOSIS — R10.13 EPIGASTRIC PAIN: ICD-10-CM

## 2024-07-09 RX ORDER — DICYCLOMINE HYDROCHLORIDE 20 MG/1
20 TABLET ORAL 3 TIMES DAILY PRN
Qty: 60 TABLET | Refills: 2 | Status: SHIPPED | OUTPATIENT
Start: 2024-07-09

## 2024-07-09 NOTE — TELEPHONE ENCOUNTER
Contacted patient to inform her of her test results and medical recommendations. Patient also informed that a rx for dicyclomine was sent to her [facility]'s pharmacy and she can take it TID as needed for abdominal pain. Patient also notified that referral for colorectal was placed in her chart and a referral coordinator will be contacting her to schedule.    Patient stated that she prefers that her dicyclomine be sent to [facility] in [place].    ----- Message from ABE Wagner sent at 7/1/2024  1:49 PM CDT -----  No significant stool burden noted on xray of abdomen. Recommend high fiber diet and liberal fluid intake. Dicyclomine 20 mg three times per day as needed for abdominal pain.     Referral coordinator will contact you to schedule appt with colorectal for hemorrhoids and rectal bleeding.

## 2024-08-06 ENCOUNTER — TELEPHONE (OUTPATIENT)
Dept: ENDOSCOPY | Facility: HOSPITAL | Age: 54
End: 2024-08-06
Payer: MEDICAID

## 2024-08-08 ENCOUNTER — TELEPHONE (OUTPATIENT)
Dept: ENDOSCOPY | Facility: HOSPITAL | Age: 54
End: 2024-08-08
Payer: MEDICAID

## 2024-10-30 DIAGNOSIS — Z12.31 SCREENING MAMMOGRAM FOR BREAST CANCER: Primary | ICD-10-CM

## 2024-11-12 ENCOUNTER — TELEPHONE (OUTPATIENT)
Dept: PULMONOLOGY | Facility: CLINIC | Age: 54
End: 2024-11-12
Payer: MEDICAID

## 2024-11-12 NOTE — TELEPHONE ENCOUNTER
I spoke with patient to let her know appointment scheduled with Dr Ramos on 12/13/24 at 1pm needs to be rescheduled due to schedule change. Patient rescheduled until 1/24/24 at 4pm with ultrasound prior at 2pm. Appointment mailed. Patient confirmed and verbalized understanding.

## 2024-11-12 NOTE — TELEPHONE ENCOUNTER
LVM for patient to return my call. I was calling to let patient know appointment with Dr Ramos will need to be rescheduled on 12/13/24 at 1pm due to book out. Patient to call back to discuss.

## 2024-11-21 ENCOUNTER — HOSPITAL ENCOUNTER (OUTPATIENT)
Dept: RADIOLOGY | Facility: HOSPITAL | Age: 54
Discharge: HOME | End: 2024-11-21
Attending: NURSE PRACTITIONER
Payer: MEDICAID

## 2024-11-21 DIAGNOSIS — Z12.31 SCREENING MAMMOGRAM FOR BREAST CANCER: ICD-10-CM

## 2024-11-21 PROCEDURE — 77067 SCR MAMMO BI INCL CAD: CPT | Mod: 26,,, | Performed by: RADIOLOGY

## 2024-11-21 PROCEDURE — 77067 SCR MAMMO BI INCL CAD: CPT | Mod: TC,PN

## 2025-01-24 ENCOUNTER — HOSPITAL ENCOUNTER (OUTPATIENT)
Dept: RADIOLOGY | Facility: HOSPITAL | Age: 55
Discharge: HOME | End: 2025-01-24
Attending: STUDENT IN AN ORGANIZED HEALTH CARE EDUCATION/TRAINING PROGRAM
Payer: MEDICAID

## 2025-01-24 ENCOUNTER — OFFICE VISIT (OUTPATIENT)
Dept: PULMONOLOGY | Facility: CLINIC | Age: 55
End: 2025-01-24
Payer: MEDICAID

## 2025-01-24 VITALS
BODY MASS INDEX: 24.6 KG/M2 | HEART RATE: 78 BPM | HEIGHT: 61 IN | OXYGEN SATURATION: 98 % | WEIGHT: 130.31 LBS | SYSTOLIC BLOOD PRESSURE: 102 MMHG | DIASTOLIC BLOOD PRESSURE: 76 MMHG

## 2025-01-24 DIAGNOSIS — E01.0 THYROMEGALY: ICD-10-CM

## 2025-01-24 DIAGNOSIS — K21.9 GASTROESOPHAGEAL REFLUX DISEASE WITHOUT ESOPHAGITIS: ICD-10-CM

## 2025-01-24 DIAGNOSIS — E01.0 THYROMEGALY: Primary | ICD-10-CM

## 2025-01-24 DIAGNOSIS — R05.3 CHRONIC COUGH: ICD-10-CM

## 2025-01-24 DIAGNOSIS — Z72.0 TOBACCO USE: ICD-10-CM

## 2025-01-24 DIAGNOSIS — R06.02 SHORTNESS OF BREATH: ICD-10-CM

## 2025-01-24 PROBLEM — J45.909 ASTHMA: Status: RESOLVED | Noted: 2024-01-05 | Resolved: 2025-01-24

## 2025-01-24 PROCEDURE — 99213 OFFICE O/P EST LOW 20 MIN: CPT | Mod: PBBFAC,25 | Performed by: STUDENT IN AN ORGANIZED HEALTH CARE EDUCATION/TRAINING PROGRAM

## 2025-01-24 PROCEDURE — 76536 US EXAM OF HEAD AND NECK: CPT | Mod: TC

## 2025-01-24 PROCEDURE — 1159F MED LIST DOCD IN RCRD: CPT | Mod: CPTII,,, | Performed by: INTERNAL MEDICINE

## 2025-01-24 PROCEDURE — 3074F SYST BP LT 130 MM HG: CPT | Mod: CPTII,,, | Performed by: INTERNAL MEDICINE

## 2025-01-24 PROCEDURE — 99214 OFFICE O/P EST MOD 30 MIN: CPT | Mod: S$PBB,,, | Performed by: INTERNAL MEDICINE

## 2025-01-24 PROCEDURE — 3078F DIAST BP <80 MM HG: CPT | Mod: CPTII,,, | Performed by: INTERNAL MEDICINE

## 2025-01-24 PROCEDURE — 3008F BODY MASS INDEX DOCD: CPT | Mod: CPTII,,, | Performed by: INTERNAL MEDICINE

## 2025-01-24 PROCEDURE — 99999 PR PBB SHADOW E&M-EST. PATIENT-LVL III: CPT | Mod: PBBFAC,,, | Performed by: STUDENT IN AN ORGANIZED HEALTH CARE EDUCATION/TRAINING PROGRAM

## 2025-01-24 PROCEDURE — 76536 US EXAM OF HEAD AND NECK: CPT | Mod: 26,,, | Performed by: RADIOLOGY

## 2025-01-24 RX ORDER — ATORVASTATIN CALCIUM 80 MG/1
80 TABLET, FILM COATED ORAL DAILY
COMMUNITY
Start: 2024-11-17

## 2025-01-24 RX ORDER — VARENICLINE TARTRATE 1 MG/1
1 TABLET, FILM COATED ORAL 2 TIMES DAILY
Qty: 30 TABLET | Refills: 1 | Status: SHIPPED | OUTPATIENT
Start: 2025-01-24

## 2025-01-24 RX ORDER — VARENICLINE TARTRATE 0.5 (11)-1
KIT ORAL
Qty: 1 EACH | Refills: 0 | Status: SHIPPED | OUTPATIENT
Start: 2025-01-24

## 2025-01-24 RX ORDER — DM/P-EPHED/ACETAMINOPH/DOXYLAM 30-7.5/3
2 LIQUID (ML) ORAL
Qty: 108 LOZENGE | Refills: 3 | Status: SHIPPED | OUTPATIENT
Start: 2025-01-24

## 2025-01-24 RX ORDER — ERGOCALCIFEROL 1.25 MG/1
50000 CAPSULE ORAL
COMMUNITY
Start: 2024-11-16

## 2025-01-24 NOTE — ASSESSMENT & PLAN NOTE
Seen by GI and underwent EGD which showed inactive gastritis. Concerned that GERD contributing to chronic cough.     - continue PPI

## 2025-01-24 NOTE — ASSESSMENT & PLAN NOTE
Extensive work-up for chronic cough that has included PFTs, TTE, EGD, and CT chest. PFTs without obstructive lung disease, TTE with normal LVEF and PASP, CT chest without abnormality to explain symptoms. Cough improved off advair inhaler so unlikely to be cough variant asthma. On PPI for GERD and underwent EGD with GI which showed gastritis. Suspect that ongoing tobacco use may be contributing to residual cough.     - reports improvement in cough off of advair   - no indication to continue inhalers at this time  - extensive discussion regarding tobacco cessation   - continue PPI for GERD

## 2025-01-24 NOTE — PROGRESS NOTES
Pt is a 55 yo AAF [facility] tobacco use disorder presenting for chronic cough. Advair stopped on previous visit as did not have obstruction on PFTs and cough is intermittent now. Smoking ~5 cigs a day. Risks of smoking explained and plan for use of chantix and nicotine lozenges. Having some difficulty with swallowing due to multi nodular goiter.     - speech therapy for difficulty swallowing.   - chantix and nicotine lozenges.     Artur Vora MD  [facility]

## 2025-01-24 NOTE — PATIENT INSTRUCTIONS
For the Chantix, you are going to start with the starter box to slowly increase the medication:  - Take one half a tablet (0.5mg tab) by mouth once a day for 3 days,then increase to half a tablet (0.5mg tab) twice a day for 4 days,then increase to one tablet (1mg tab) twice day   - Take the nicotine lozenges when you have cravings to smoke, you can take up to 20 in a day

## 2025-01-24 NOTE — PROGRESS NOTES
[facility]    SUBJECTIVE:     Chief Complaint: cough    History of Present Illness:  Sonia Hicks is a 54 y.o. female with a history of HTN and HLD who first established with pulm clinic in 1/2024 for evaluation of cough. She presents for follow-up.     At last visit discontinued advair as unclear if benefiting patient. Since last visit she reports her chronic cough has improved, she is still having some cough at night but significantly better and overall feeling like her symptoms are well-controlled. She denies significant post-nasal drip. She is still smoking, open to opens to help with tobacco cessation.     She reports some intermittent difficulty swallowing. Also reports some shortness of breath with more strenuous exercise and also some random episodes of shortness of breath that resolve spontaneously. Is able to walk without limitations and can do ADLs but with more strenuous exertion becomes short of breath. Was previously doing house work and yard work for exercise, has not been as active lately.     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 ([employer])  Prior lung disease: None  Allergies/sinusitis: Allergies  Pets: Dog (for 1 year)  Travel: none  TB: past history of exposure to TB (patient had skin test in the past that was negative)  Prior cancer: None  Prior hospitalization/intubation: None    Review of patient's allergies indicates:  No Known Allergies    Past Medical History:   Diagnosis Date    High cholesterol     Hypertension     Ovarian  "cyst      Past Surgical History:   Procedure Laterality Date    ABDOMINAL SURGERY      COLONOSCOPY N/A 2018    Procedure: COLONOSCOPY Golytely;  Surgeon: Nina Padilla MD;  Location: [facility];  Service: Endoscopy;  Laterality: N/A;    ESOPHAGOGASTRODUODENOSCOPY N/A 2024    Procedure: EGD (ESOPHAGOGASTRODUODENOSCOPY);  Surgeon: Pelon Gifford MD;  Location: [facility];  Service: Endoscopy;  Laterality: N/A;    oophorectomy       Family History   Problem Relation Name Age of Onset    Hypertension Mother      Diabetes Mother      Heart disease Mother           of MI    Liver disease Mother          failure    Heart disease Sister      Hypertension Brother      Diabetes Brother      Heart disease Sister         OBJECTIVE:     Vital Signs  Vitals:    25 1551   BP: 102/76   BP Location: Right arm   Patient Position: Sitting   Pulse: 78   SpO2: 98%   Weight: 59.1 kg (130 lb 4.7 oz)   Height: 5' 1" (1.549 m)     Body mass index is 24.62 kg/m².    Physical Exam:  Physical Exam  GEN: middle-aged woman, sitting in bed in NAD  HEENT: face symmetric, conjunctivae anicteric, MMM  CV: regular rhythm, normal rate, no audible murmurs   PULM: CTAB, no wheezing, no crackles  Abd: non-distended   Ext: no clubbing   MSK: moving all extremities spontaneously  Skin: no rashes  Neuro: alert, answering questions appropriately     Laboratory:  CBC  Lab Results   Component Value Date    WBC 8.89 2024    HGB 12.0 2024    HCT 38.5 2024     2024    MCV 87 2024    RDW 14.3 2024     BMP  Lab Results   Component Value Date     2024    K 4.8 2024     2024    CO2 27 2024    BUN 8 2024    CREATININE 0.8 2024     2024    CALCIUM 10.6 (H) 2024     LFTs  Lab Results   Component Value Date    PROT 7.9 2024    ALBUMIN 4.1 2024    BILITOT 0.2 2024    AST 16 2024    ALKPHOS 104 2024    " ALT 12 04/26/2024     Peripheral eosinophils 4/2024 - 0.1  BNP 4/2024 - 16    PFTs 1/9/2024  FEV1/FVC 83  FEV1 1.54 (75%)  FVC 1.86 (73%)  TLC 3.18 (72%)  RV 0.98 (59%)  DLCO 14.67 (67%)      4/26/2024 6MWT:  Patient ambulated 487 meters  Baseline SpO2 98%, no desaturation with exercise   Abdiel Scale - Legs 2 Breathing 4    Chest Imaging  CT chest 4/26/2024 - reviewed  Impression:  1. Bandlike atelectasis/scarring in the lingula.  Minimal linear atelectasis in the right lower lobe.  2. No acute abnormalities noted in this study.  3. Prominent thyroid gland with no evidence of nodular lesions in this study.  If there is a clinical concern ultrasound of the thyroid gland is recommended for further evaluation.    Diagnostic Results:  TTE 3/2024    Left Ventricle: The left ventricle is normal in size. Normal wall thickness. Normal wall motion. There is normal systolic function with a visually estimated ejection fraction of 65 - 70%. There is normal diastolic function.    Right Ventricle: Normal right ventricular cavity size. Wall thickness is normal. Right ventricle wall motion  is normal. Systolic function is normal.    Tricuspid Valve: There is mild regurgitation.    Pulmonary Artery: The estimated pulmonary artery systolic pressure is 21 mmHg.    IVC/SVC: Normal venous pressure at 3 mmHg.    ASSESSMENT/PLAN:     Problem Noted   Thyromegaly 1/24/2025   Tobacco Use 1/24/2025   Gastroesophageal Reflux Disease 7/1/2024   Shortness of Breath 4/26/2024   Chronic Cough 7/28/2014   Asthma (Resolved) 1/5/2024     Problem List Items Addressed This Visit          Pulmonary    Chronic cough    Current Assessment & Plan     Extensive work-up for chronic cough that has included PFTs, TTE, EGD, and CT chest. PFTs without obstructive lung disease, TTE with normal LVEF and PASP, CT chest without abnormality to explain symptoms. Cough improved off advair inhaler so unlikely to be cough variant asthma. On PPI for GERD and underwent EGD  with GI which showed gastritis. Suspect that ongoing tobacco use may be contributing to residual cough.     - reports improvement in cough off of advair   - no indication to continue inhalers at this time  - extensive discussion regarding tobacco cessation   - continue PPI for GERD           Shortness of breath    Current Assessment & Plan     Dyspnea with more strenuous exercise. Suspect this may be due to deconditioning rather than asthma especially given improvement in patient's symptoms since stopping advair. Discussed trying to increase exercise.     - goal to walk three times per week for 15 minutes             Endocrine    Thyromegaly - Primary    Current Assessment & Plan     Thyroid ultrasound with multinodular thyroid. She is having some difficulty swallowing and has already undergone EGD.     - referral to SLP            GI    Gastroesophageal reflux disease    Current Assessment & Plan     Seen by GI and underwent EGD which showed inactive gastritis. Concerned that GERD contributing to chronic cough.     - continue PPI             Other    Tobacco use    Current Assessment & Plan     Extensive discussion about tobacco cessation. Patient interested in addition of medications to try and help her decrease cigarette use. Patient with history of depression and SI, reviewed medication options and side effects.     - nicotine lozenges PRN (not to exceed 20 in 24 hours)  - start varenicline, prescribed starter pack and maintenance pack and reviewed taper   - discussed side effects including vivid dreams and rare side effects of mood changes and SI         Relevant Orders    Ambulatory Referral/Consult to Speech Therapy     RTC 3 months     Veronica Ramos MD  Pulmonary & Critical Care Fellow

## 2025-01-25 NOTE — ASSESSMENT & PLAN NOTE
Thyroid ultrasound with multinodular thyroid. She is having some difficulty swallowing and has already undergone EGD.     - referral to SLP

## 2025-01-25 NOTE — ASSESSMENT & PLAN NOTE
Extensive discussion about tobacco cessation. Patient interested in addition of medications to try and help her decrease cigarette use. Patient with history of depression and SI, reviewed medication options and side effects.     - nicotine lozenges PRN (not to exceed 20 in 24 hours)  - start varenicline, prescribed starter pack and maintenance pack and reviewed taper   - discussed side effects including vivid dreams and rare side effects of mood changes and SI

## 2025-01-25 NOTE — ASSESSMENT & PLAN NOTE
Dyspnea with more strenuous exercise. Suspect this may be due to deconditioning rather than asthma especially given improvement in patient's symptoms since stopping advair. Discussed trying to increase exercise.     - goal to walk three times per week for 15 minutes

## 2025-02-06 ENCOUNTER — DOCUMENTATION ONLY (OUTPATIENT)
Dept: REHABILITATION | Facility: HOSPITAL | Age: 55
End: 2025-02-06
Payer: MEDICAID

## 2025-02-06 NOTE — PROGRESS NOTES
No Show Note/Documentation    Patient: Sonia Hicks  Date of Session: 2/6/2025  Diagnosis: No diagnosis found.   MRN: [id number]    Sonia Hicks did not attend her  scheduled therapy appointment today. She did not call to cancel nor reschedule. This is the 1st appointment that she has not attended. No charges have been posted today.     Simona Flower SLPD, L-SLP,CCC-SLP, CBIS  Speech-Language Pathologist  Certified Brain Injury Specialist  2/6/2025

## 2025-05-02 ENCOUNTER — TELEPHONE (OUTPATIENT)
Dept: PULMONOLOGY | Facility: CLINIC | Age: 55
End: 2025-05-02
Payer: MEDICAID

## 2025-05-02 NOTE — TELEPHONE ENCOUNTER
Left message on pt voicemail, informing her that I'm contacting her in regards to rescheduling her appointment that she missed on today. I advised pt that if she wants to reschedule appointment or if she has any questions or concerns, she may contact the office. Office number has been provided.

## 2025-07-14 ENCOUNTER — HOSPITAL ENCOUNTER (EMERGENCY)
Facility: HOSPITAL | Age: 55
Discharge: HOME | End: 2025-07-14
Attending: FAMILY MEDICINE
Payer: MEDICAID

## 2025-07-14 VITALS
HEIGHT: 61 IN | DIASTOLIC BLOOD PRESSURE: 79 MMHG | TEMPERATURE: 99 F | BODY MASS INDEX: 24.55 KG/M2 | OXYGEN SATURATION: 99 % | RESPIRATION RATE: 19 BRPM | SYSTOLIC BLOOD PRESSURE: 138 MMHG | HEART RATE: 87 BPM | WEIGHT: 130 LBS

## 2025-07-14 DIAGNOSIS — S05.01XA ABRASION OF RIGHT CORNEA, INITIAL ENCOUNTER: Primary | ICD-10-CM

## 2025-07-14 PROCEDURE — 99283 EMERGENCY DEPT VISIT LOW MDM: CPT | Mod: ER

## 2025-07-14 PROCEDURE — 25000003 PHARM REV CODE 250: Mod: ER | Performed by: FAMILY MEDICINE

## 2025-07-14 RX ORDER — TOBRAMYCIN AND DEXAMETHASONE 3; 1 MG/ML; MG/ML
1 SUSPENSION/ DROPS OPHTHALMIC EVERY 6 HOURS
Qty: 5 ML | Refills: 0 | Status: SHIPPED | OUTPATIENT
Start: 2025-07-14

## 2025-07-14 RX ORDER — OLOPATADINE HYDROCHLORIDE 1 MG/ML
1 SOLUTION OPHTHALMIC 2 TIMES DAILY
Qty: 5 ML | Refills: 0 | Status: SHIPPED | OUTPATIENT
Start: 2025-07-14

## 2025-07-14 RX ORDER — TETRACAINE HYDROCHLORIDE 5 MG/ML
2 SOLUTION OPHTHALMIC
Status: COMPLETED | OUTPATIENT
Start: 2025-07-14 | End: 2025-07-14

## 2025-07-14 RX ORDER — FLUORESCEIN SODIUM 1 MG/MG
1 STRIP OPHTHALMIC
Status: COMPLETED | OUTPATIENT
Start: 2025-07-14 | End: 2025-07-14

## 2025-07-14 RX ADMIN — TETRACAINE HYDROCHLORIDE 2 DROP: 5 SOLUTION OPHTHALMIC at 12:07

## 2025-07-14 RX ADMIN — FLUORESCEIN SODIUM 1 EACH: 1 STRIP OPHTHALMIC at 12:07

## 2025-07-14 NOTE — ED PROVIDER NOTES
"Encounter Date: 2025       History     Chief Complaint   Patient presents with    Eye Problem     Pt C/O "irritation" and excessive tearing to R eye X 2 weeks.      54-year-old female complains of an body sensation in her right eye for last 2 weeks.  She has been kept rubbing for last 2 weeks which is watering and sometimes gets red.  Vision is normal.    The history is provided by the patient.     Review of patient's allergies indicates:  No Known Allergies  Past Medical History:   Diagnosis Date    High cholesterol     Hypertension     Ovarian cyst      Past Surgical History:   Procedure Laterality Date    ABDOMINAL SURGERY      COLONOSCOPY N/A 2018    Procedure: COLONOSCOPY Golytely;  Surgeon: Nina Padilla MD;  Location: [facility];  Service: Endoscopy;  Laterality: N/A;    ESOPHAGOGASTRODUODENOSCOPY N/A 2024    Procedure: EGD (ESOPHAGOGASTRODUODENOSCOPY);  Surgeon: Pelon Gifford MD;  Location: [facility];  Service: Endoscopy;  Laterality: N/A;    oophorectomy       Family History   Problem Relation Name Age of Onset    Hypertension Mother      Diabetes Mother      Heart disease Mother           of MI    Liver disease Mother          failure    Heart disease Sister      Hypertension Brother      Diabetes Brother      Heart disease Sister       Social History[1]  Review of Systems   Eyes:  Positive for pain and redness.   All other systems reviewed and are negative.      Physical Exam     Initial Vitals [25 1147]   BP Pulse Resp Temp SpO2   138/79 87 19 98.5 °F (36.9 °C) 99 %      MAP       --         Physical Exam    Nursing note and vitals reviewed.  Constitutional: Vital signs are normal. She appears well-developed and well-nourished. She is active. No distress.   HENT:   Head: Normocephalic.   Nose: Nose normal. Mouth/Throat: Oropharynx is clear and moist and mucous membranes are normal.   Eyes: EOM and lids are normal. Right conjunctiva is injected.   Right conjunctiva " mildly injected.  Scarring noted on right cornea.  Few abrasions at 6 o'clock position.   Neck: Neck supple.   Normal range of motion.  Cardiovascular:  Normal rate, regular rhythm, S1 normal, S2 normal and normal heart sounds.           Pulmonary/Chest: Breath sounds normal. No respiratory distress. She has no wheezes. She has no rales.   Abdominal: Abdomen is soft. Bowel sounds are normal.   Musculoskeletal:      Right upper arm: Normal.      Left upper arm: Normal.      Cervical back: Normal range of motion and neck supple.      Right lower leg: Normal.      Left lower leg: Normal.     Neurological: She is alert and oriented to person, place, and time. She has normal strength. GCS score is 15. GCS eye subscore is 4. GCS verbal subscore is 5. GCS motor subscore is 6.   Skin: Skin is warm. Capillary refill takes less than 2 seconds.   Psychiatric: She has a normal mood and affect. Her speech is normal and behavior is normal. Thought content normal. Cognition and memory are normal.         ED Course   Procedures  Labs Reviewed - No data to display       Imaging Results    None          Medications   TETRAcaine HCl (PF) 0.5 % Drop 2 drop (2 drops Both Eyes Given by Other 7/14/25 1202)   fluorescein ophthalmic strip 1 each (1 each Left Eye Given by Other 7/14/25 1205)     Medical Decision Making  Abrasion, scarring, foreign body.  Questionable scarring/abrasion.  I flush and irrigation.  TobraDex drops and Pataday drops.  Patient is advised to follow up with Ophthalmology.    Risk  Prescription drug management.                                          Clinical Impression:  Final diagnoses:  [S05.01XA] Abrasion of right cornea, initial encounter (Primary)          ED Disposition Condition    Discharge Stable          ED Prescriptions       Medication Sig Dispense Start Date End Date Auth. Provider    olopatadine (PATANOL) 0.1 % ophthalmic solution Place 1 drop into the right eye 2 (two) times daily. 5 mL 7/14/2025 --  Epifanio Vasquez MD    tobramycin-dexAMETHasone 0.3-0.1% (TOBRADEX) 0.3-0.1 % DrpS Place 1 drop into the right eye every 6 (six) hours. 5 mL 7/14/2025 -- Epifanio Vasquez MD          Follow-up Information       Follow up With Specialties Details Why Contact Info    Antonietta Chi FNP Family Medicine Schedule an appointment as soon as possible for a visit in 1 week For  re-check [address] [place] [id number]  [phone]                     [1]   Social History  Tobacco Use    Smoking status: Every Day     Current packs/day: 0.50     Average packs/day: 0.5 packs/day for 30.0 years (15.0 ttl pk-yrs)     Types: Cigarettes    Smokeless tobacco: Never   Substance Use Topics    Alcohol use: Yes     Comment: socially    Drug use: Yes     Types: Marijuana     Comment: last smoked 8/8 evening        Epifanio Vasquez MD  07/14/25 2661